# Patient Record
Sex: FEMALE | Race: WHITE | NOT HISPANIC OR LATINO | Employment: OTHER | URBAN - METROPOLITAN AREA
[De-identification: names, ages, dates, MRNs, and addresses within clinical notes are randomized per-mention and may not be internally consistent; named-entity substitution may affect disease eponyms.]

---

## 2023-04-26 DIAGNOSIS — E11.9 TYPE 2 DIABETES MELLITUS WITHOUT COMPLICATION, WITHOUT LONG-TERM CURRENT USE OF INSULIN (MULTI): Primary | ICD-10-CM

## 2023-04-26 RX ORDER — GLIPIZIDE 5 MG/1
1 TABLET, FILM COATED, EXTENDED RELEASE ORAL
COMMUNITY
Start: 2019-09-12 | End: 2023-04-26 | Stop reason: SDUPTHER

## 2023-04-27 RX ORDER — GLIPIZIDE 5 MG/1
5 TABLET, FILM COATED, EXTENDED RELEASE ORAL
Qty: 90 TABLET | Refills: 1 | Status: SHIPPED
Start: 2023-04-27 | End: 2023-08-09 | Stop reason: SDUPTHER

## 2023-05-08 ENCOUNTER — OFFICE VISIT (OUTPATIENT)
Dept: PRIMARY CARE | Facility: CLINIC | Age: 73
End: 2023-05-08
Payer: MEDICARE

## 2023-05-08 VITALS
BODY MASS INDEX: 33.57 KG/M2 | TEMPERATURE: 97.5 F | OXYGEN SATURATION: 95 % | HEART RATE: 73 BPM | WEIGHT: 208 LBS | SYSTOLIC BLOOD PRESSURE: 122 MMHG | DIASTOLIC BLOOD PRESSURE: 60 MMHG

## 2023-05-08 DIAGNOSIS — E11.9 CONTROLLED TYPE 2 DIABETES MELLITUS WITHOUT COMPLICATION, WITHOUT LONG-TERM CURRENT USE OF INSULIN (MULTI): ICD-10-CM

## 2023-05-08 DIAGNOSIS — Z00.00 MEDICARE ANNUAL WELLNESS VISIT, SUBSEQUENT: Primary | ICD-10-CM

## 2023-05-08 DIAGNOSIS — E66.09 CLASS 1 OBESITY DUE TO EXCESS CALORIES WITH SERIOUS COMORBIDITY AND BODY MASS INDEX (BMI) OF 33.0 TO 33.9 IN ADULT: ICD-10-CM

## 2023-05-08 DIAGNOSIS — R32 URINARY INCONTINENCE, UNSPECIFIED TYPE: ICD-10-CM

## 2023-05-08 DIAGNOSIS — Z23 ENCOUNTER FOR IMMUNIZATION: ICD-10-CM

## 2023-05-08 DIAGNOSIS — E11.9 TYPE 2 DIABETES MELLITUS WITHOUT COMPLICATION, WITHOUT LONG-TERM CURRENT USE OF INSULIN (MULTI): ICD-10-CM

## 2023-05-08 LAB — HBA1C MFR BLD: 7.8 % (ref 4.2–6.5)

## 2023-05-08 PROCEDURE — G0439 PPPS, SUBSEQ VISIT: HCPCS | Performed by: STUDENT IN AN ORGANIZED HEALTH CARE EDUCATION/TRAINING PROGRAM

## 2023-05-08 PROCEDURE — 1159F MED LIST DOCD IN RCRD: CPT | Performed by: STUDENT IN AN ORGANIZED HEALTH CARE EDUCATION/TRAINING PROGRAM

## 2023-05-08 PROCEDURE — 99213 OFFICE O/P EST LOW 20 MIN: CPT | Performed by: STUDENT IN AN ORGANIZED HEALTH CARE EDUCATION/TRAINING PROGRAM

## 2023-05-08 PROCEDURE — 3051F HG A1C>EQUAL 7.0%<8.0%: CPT | Performed by: STUDENT IN AN ORGANIZED HEALTH CARE EDUCATION/TRAINING PROGRAM

## 2023-05-08 PROCEDURE — 3074F SYST BP LT 130 MM HG: CPT | Performed by: STUDENT IN AN ORGANIZED HEALTH CARE EDUCATION/TRAINING PROGRAM

## 2023-05-08 PROCEDURE — 1160F RVW MEDS BY RX/DR IN RCRD: CPT | Performed by: STUDENT IN AN ORGANIZED HEALTH CARE EDUCATION/TRAINING PROGRAM

## 2023-05-08 PROCEDURE — 1170F FXNL STATUS ASSESSED: CPT | Performed by: STUDENT IN AN ORGANIZED HEALTH CARE EDUCATION/TRAINING PROGRAM

## 2023-05-08 PROCEDURE — 3078F DIAST BP <80 MM HG: CPT | Performed by: STUDENT IN AN ORGANIZED HEALTH CARE EDUCATION/TRAINING PROGRAM

## 2023-05-08 PROCEDURE — 83036 HEMOGLOBIN GLYCOSYLATED A1C: CPT | Performed by: STUDENT IN AN ORGANIZED HEALTH CARE EDUCATION/TRAINING PROGRAM

## 2023-05-08 PROCEDURE — 4010F ACE/ARB THERAPY RXD/TAKEN: CPT | Performed by: STUDENT IN AN ORGANIZED HEALTH CARE EDUCATION/TRAINING PROGRAM

## 2023-05-08 PROCEDURE — 1124F ACP DISCUSS-NO DSCNMKR DOCD: CPT | Performed by: STUDENT IN AN ORGANIZED HEALTH CARE EDUCATION/TRAINING PROGRAM

## 2023-05-08 PROCEDURE — 3008F BODY MASS INDEX DOCD: CPT | Performed by: STUDENT IN AN ORGANIZED HEALTH CARE EDUCATION/TRAINING PROGRAM

## 2023-05-08 PROCEDURE — 1036F TOBACCO NON-USER: CPT | Performed by: STUDENT IN AN ORGANIZED HEALTH CARE EDUCATION/TRAINING PROGRAM

## 2023-05-08 RX ORDER — ATORVASTATIN CALCIUM 20 MG/1
1 TABLET, FILM COATED ORAL DAILY
COMMUNITY
Start: 2016-11-29 | End: 2023-11-13 | Stop reason: SDUPTHER

## 2023-05-08 RX ORDER — METFORMIN HYDROCHLORIDE 1000 MG/1
1 TABLET ORAL DAILY
COMMUNITY
Start: 2013-09-10 | End: 2023-11-13 | Stop reason: SDUPTHER

## 2023-05-08 RX ORDER — AMLODIPINE BESYLATE 10 MG/1
1 TABLET ORAL DAILY
COMMUNITY
Start: 2020-09-04 | End: 2023-11-13 | Stop reason: SDUPTHER

## 2023-05-08 RX ORDER — ACETAMINOPHEN 500 MG
TABLET ORAL WEEKLY
COMMUNITY
Start: 2020-10-15

## 2023-05-08 RX ORDER — IRBESARTAN 75 MG/1
1 TABLET ORAL DAILY
COMMUNITY
Start: 2021-02-23 | End: 2023-11-13 | Stop reason: SDUPTHER

## 2023-05-08 RX ORDER — OXYBUTYNIN CHLORIDE 5 MG/1
1 TABLET, EXTENDED RELEASE ORAL DAILY
COMMUNITY
Start: 2023-02-01 | End: 2023-05-08 | Stop reason: SINTOL

## 2023-05-08 RX ORDER — EMPAGLIFLOZIN 25 MG/1
1 TABLET, FILM COATED ORAL DAILY
COMMUNITY
Start: 2023-02-01 | End: 2023-05-08 | Stop reason: SDUPTHER

## 2023-05-08 RX ORDER — PAROXETINE HYDROCHLORIDE 40 MG/1
1 TABLET, FILM COATED ORAL DAILY
COMMUNITY
Start: 2020-10-15

## 2023-05-08 ASSESSMENT — ENCOUNTER SYMPTOMS
CHEST TIGHTNESS: 0
FATIGUE: 0
MUSCULOSKELETAL NEGATIVE: 1
DIZZINESS: 0
HEADACHES: 0
DYSPHORIC MOOD: 0
ACTIVITY CHANGE: 0
SHORTNESS OF BREATH: 0
GASTROINTESTINAL NEGATIVE: 1
SLEEP DISTURBANCE: 0

## 2023-05-08 ASSESSMENT — ACTIVITIES OF DAILY LIVING (ADL)
DRESSING: INDEPENDENT
TAKING_MEDICATION: INDEPENDENT
GROCERY_SHOPPING: INDEPENDENT
DOING_HOUSEWORK: INDEPENDENT
MANAGING_FINANCES: INDEPENDENT
BATHING: INDEPENDENT

## 2023-05-08 ASSESSMENT — PATIENT HEALTH QUESTIONNAIRE - PHQ9
SUM OF ALL RESPONSES TO PHQ9 QUESTIONS 1 AND 2: 0
2. FEELING DOWN, DEPRESSED OR HOPELESS: NOT AT ALL
1. LITTLE INTEREST OR PLEASURE IN DOING THINGS: NOT AT ALL

## 2023-05-08 NOTE — PROGRESS NOTES
Subjective   Patient ID: Tess Nava is a 73 y.o. female who presents for Medicare Annual Wellness Visit Subsequent (Medicare annual wellness visit/A1C check).  Exercising every day. Breathing has been getting better. Cardiac workup was good. Normal stress test. Echo unremarkable. Symptoms suspected to be from deconditioning.     Diet is about the same, is not very strict with this. Has been drinking more water. Does drink pop sometimes. Last a1c 7.7%    Had increased tooth decay in March. Had an abscess.     Oxybutynin works sometimes. Noticed increased dry mouth.                 Review of Systems   Constitutional:  Negative for activity change and fatigue.   HENT:  Positive for dental problem and mouth sores.    Respiratory:  Negative for chest tightness and shortness of breath.    Cardiovascular:  Negative for chest pain.   Gastrointestinal: Negative.    Musculoskeletal: Negative.    Neurological:  Negative for dizziness and headaches.   Psychiatric/Behavioral:  Negative for dysphoric mood and sleep disturbance.    All other systems reviewed and are negative.      Objective   Physical Exam  Constitutional:       General: She is not in acute distress.  HENT:      Head: Normocephalic.   Eyes:      Pupils: Pupils are equal, round, and reactive to light.   Cardiovascular:      Rate and Rhythm: Normal rate and regular rhythm.   Pulmonary:      Effort: Pulmonary effort is normal. No respiratory distress.      Breath sounds: No wheezing, rhonchi or rales.   Musculoskeletal:         General: Normal range of motion.   Skin:     General: Skin is warm and dry.   Neurological:      General: No focal deficit present.      Mental Status: She is alert. Mental status is at baseline.      Gait: Gait normal.   Psychiatric:         Mood and Affect: Mood normal.         Behavior: Behavior normal.       Current Outpatient Medications:     amLODIPine (Norvasc) 10 mg tablet, Take 1 tablet (10 mg) by mouth once daily., Disp: , Rfl:      atorvastatin (Lipitor) 20 mg tablet, Take 1 tablet (20 mg) by mouth once daily., Disp: , Rfl:     cholecalciferol (Vitamin D-3) 50 mcg (2,000 unit) capsule, Take by mouth per week., Disp: , Rfl:     CHROMIUM ORAL, Take 800 mg by mouth once daily., Disp: , Rfl:     irbesartan (Avapro) 75 mg tablet, Take 1 tablet (75 mg) by mouth once daily., Disp: , Rfl:     metFORMIN (Glucophage) 1,000 mg tablet, Take 1 tablet (1,000 mg) by mouth once daily., Disp: , Rfl:     PaxiL 40 mg tablet, Take 1 tablet (40 mg) by mouth once daily., Disp: , Rfl:     aspirin-calcium carbonate 81 mg-300 mg calcium(777 mg) tablet, Take 1 tablet by mouth once daily., Disp: , Rfl:     diph,pertuss,acel,,tet vac,PF, (Adacel) 2 Lf-(2.5-5-3-5 mcg)-5Lf/0.5 mL injection, Inject 0.5 mL into the shoulder, thigh, or buttocks 1 time for 1 dose., Disp: 0.5 mL, Rfl: 0    empagliflozin (Jardiance) 25 mg, Take 1 tablet (25 mg) by mouth once daily., Disp: 30 tablet, Rfl: 2    fish oil concentrate (Omega-3) 120-180 mg capsule, Take by mouth., Disp: , Rfl:     glipiZIDE XL (Glucotrol XL) 5 mg 24 hr tablet, Take 1 tablet (5 mg) by mouth once daily with a meal., Disp: 90 tablet, Rfl: 1      Assessment/Plan   Diagnoses and all orders for this visit:  Medicare annual wellness visit, subsequent  Comments:  UTD on mammogram, due Sept  UTD on colon, last done 2020  tdap sent to pharmacy  Type 2 diabetes mellitus without complication, without long-term current use of insulin (CMS/[facility])  Comments:  reassess in 3 months, if still no improvement will change jardiance due to cost and minimal effectiveness  Orders:  -     POCT Glycosylated Hemoglobin (HGB A1C) docked device  -     empagliflozin (Jardiance) 25 mg; Take 1 tablet (25 mg) by mouth once daily.  Controlled type 2 diabetes mellitus without complication, without long-term current use of insulin (CMS/[facility])  -     POCT Glycosylated Hemoglobin (HGB A1C) docked device  Urinary incontinence, unspecified  type  Comments:  will stop oxybutynin  tooth decay, dry mouth, and canker sores  can consider alternate in future if recurring  Encounter for immunization  -     diph,pertuss,acel,,tet vac,PF, (Adacel) 2 Lf-(2.5-5-3-5 mcg)-5Lf/0.5 mL injection; Inject 0.5 mL into the shoulder, thigh, or buttocks 1 time for 1 dose.  Class 1 obesity due to excess calories with serious comorbidity and body mass index (BMI) of 33.0 to 33.9 in adult  Other orders  -     POCT GLYCOSYLATED HEMOGLOBIN (HGB A1C)    The patient received Provided instructions on dietary changes  Provided instructions on exercise because they have an above normal BMI.      Nazia Dean, DO

## 2023-05-12 ENCOUNTER — TELEPHONE (OUTPATIENT)
Dept: PRIMARY CARE | Facility: CLINIC | Age: 73
End: 2023-05-12
Payer: MEDICARE

## 2023-05-12 DIAGNOSIS — H10.9 BACTERIAL CONJUNCTIVITIS: Primary | ICD-10-CM

## 2023-05-12 RX ORDER — NEOMYCIN SULFATE, POLYMYXIN B SULFATE, BACITRACIN ZINC, HYDROCORTISONE 3.5; 10000; 400; 1 MG/G; [USP'U]/G; [USP'U]/G; MG/G
OINTMENT OPHTHALMIC 2 TIMES DAILY
Qty: 3.5 G | Refills: 0 | Status: SHIPPED | OUTPATIENT
Start: 2023-05-12 | End: 2023-05-19

## 2023-05-12 NOTE — TELEPHONE ENCOUNTER
Patient called she tried 4 different pharmacies  they do not have the ointment is there anything else you can prescribe her ?

## 2023-05-12 NOTE — TELEPHONE ENCOUNTER
Patient called shes been exposed to Pink eye and her eyes have watery,itchy,and goopy . Wants to know if you can prescribe her something for it

## 2023-08-09 ENCOUNTER — OFFICE VISIT (OUTPATIENT)
Dept: PRIMARY CARE | Facility: CLINIC | Age: 73
End: 2023-08-09
Payer: MEDICARE

## 2023-08-09 VITALS
SYSTOLIC BLOOD PRESSURE: 122 MMHG | DIASTOLIC BLOOD PRESSURE: 60 MMHG | HEART RATE: 67 BPM | OXYGEN SATURATION: 97 % | HEIGHT: 66 IN | WEIGHT: 203 LBS | BODY MASS INDEX: 32.62 KG/M2

## 2023-08-09 DIAGNOSIS — Z12.31 BREAST CANCER SCREENING BY MAMMOGRAM: ICD-10-CM

## 2023-08-09 DIAGNOSIS — E66.09 CLASS 1 OBESITY DUE TO EXCESS CALORIES WITH SERIOUS COMORBIDITY AND BODY MASS INDEX (BMI) OF 32.0 TO 32.9 IN ADULT: ICD-10-CM

## 2023-08-09 DIAGNOSIS — E78.2 MIXED HYPERLIPIDEMIA: ICD-10-CM

## 2023-08-09 DIAGNOSIS — I10 PRIMARY HYPERTENSION: ICD-10-CM

## 2023-08-09 DIAGNOSIS — M19.049 HAND ARTHRITIS: ICD-10-CM

## 2023-08-09 DIAGNOSIS — C02.9 CANCER OF TONGUE (MULTI): ICD-10-CM

## 2023-08-09 DIAGNOSIS — E11.9 TYPE 2 DIABETES MELLITUS WITHOUT COMPLICATION, WITHOUT LONG-TERM CURRENT USE OF INSULIN (MULTI): Primary | ICD-10-CM

## 2023-08-09 LAB — HBA1C MFR BLD: 7.8 % (ref 4.2–6.5)

## 2023-08-09 PROCEDURE — 4010F ACE/ARB THERAPY RXD/TAKEN: CPT | Performed by: STUDENT IN AN ORGANIZED HEALTH CARE EDUCATION/TRAINING PROGRAM

## 2023-08-09 PROCEDURE — 3074F SYST BP LT 130 MM HG: CPT | Performed by: STUDENT IN AN ORGANIZED HEALTH CARE EDUCATION/TRAINING PROGRAM

## 2023-08-09 PROCEDURE — 1160F RVW MEDS BY RX/DR IN RCRD: CPT | Performed by: STUDENT IN AN ORGANIZED HEALTH CARE EDUCATION/TRAINING PROGRAM

## 2023-08-09 PROCEDURE — 3078F DIAST BP <80 MM HG: CPT | Performed by: STUDENT IN AN ORGANIZED HEALTH CARE EDUCATION/TRAINING PROGRAM

## 2023-08-09 PROCEDURE — 3008F BODY MASS INDEX DOCD: CPT | Performed by: STUDENT IN AN ORGANIZED HEALTH CARE EDUCATION/TRAINING PROGRAM

## 2023-08-09 PROCEDURE — 1036F TOBACCO NON-USER: CPT | Performed by: STUDENT IN AN ORGANIZED HEALTH CARE EDUCATION/TRAINING PROGRAM

## 2023-08-09 PROCEDURE — 1159F MED LIST DOCD IN RCRD: CPT | Performed by: STUDENT IN AN ORGANIZED HEALTH CARE EDUCATION/TRAINING PROGRAM

## 2023-08-09 PROCEDURE — 99213 OFFICE O/P EST LOW 20 MIN: CPT | Performed by: STUDENT IN AN ORGANIZED HEALTH CARE EDUCATION/TRAINING PROGRAM

## 2023-08-09 PROCEDURE — 3051F HG A1C>EQUAL 7.0%<8.0%: CPT | Performed by: STUDENT IN AN ORGANIZED HEALTH CARE EDUCATION/TRAINING PROGRAM

## 2023-08-09 PROCEDURE — 83036 HEMOGLOBIN GLYCOSYLATED A1C: CPT | Mod: CLIA WAIVED TEST | Performed by: STUDENT IN AN ORGANIZED HEALTH CARE EDUCATION/TRAINING PROGRAM

## 2023-08-09 RX ORDER — GLIPIZIDE 10 MG/1
10 TABLET, FILM COATED, EXTENDED RELEASE ORAL
Qty: 90 TABLET | Refills: 1 | Status: SHIPPED | OUTPATIENT
Start: 2023-08-09 | End: 2023-11-13 | Stop reason: SDUPTHER

## 2023-08-09 ASSESSMENT — PATIENT HEALTH QUESTIONNAIRE - PHQ9
1. LITTLE INTEREST OR PLEASURE IN DOING THINGS: NOT AT ALL
SUM OF ALL RESPONSES TO PHQ9 QUESTIONS 1 AND 2: 0
2. FEELING DOWN, DEPRESSED OR HOPELESS: NOT AT ALL

## 2023-08-09 ASSESSMENT — ENCOUNTER SYMPTOMS
FATIGUE: 0
RESPIRATORY NEGATIVE: 1
NEUROLOGICAL NEGATIVE: 1
GASTROINTESTINAL NEGATIVE: 1
SLEEP DISTURBANCE: 0
ACTIVITY CHANGE: 1
CARDIOVASCULAR NEGATIVE: 1
ARTHRALGIAS: 1

## 2023-08-09 NOTE — PROGRESS NOTES
Subjective   Patient ID: Tess Nava is a 73 y.o. female who presents for Follow-up (3 month check up/A1C).  Does not check BG at home.  Last A1c was 7.8.  Has been exercising more.  Trying to stay active.  Doing well with this.    Currently taking Jardiance and metformin along with 5 mg glipizide.    Last eye exam was March.     Sleep is broken. Wakes up frequently.     Due for mammogram next month    Hand pains at times. Takes ibuprofen 1x per month which helps. Has not tried topicals. Chronic intermittent issue.         Review of Systems   Constitutional:  Positive for activity change. Negative for fatigue.   HENT: Negative.     Respiratory: Negative.     Cardiovascular: Negative.    Gastrointestinal: Negative.    Musculoskeletal:  Positive for arthralgias.   Neurological: Negative.    Psychiatric/Behavioral:  Negative for sleep disturbance.    All other systems reviewed and are negative.      Objective   Physical Exam  Vitals reviewed.   HENT:      Head: Normocephalic.      Mouth/Throat:      Mouth: Mucous membranes are moist.   Eyes:      Pupils: Pupils are equal, round, and reactive to light.   Cardiovascular:      Rate and Rhythm: Normal rate and regular rhythm.   Pulmonary:      Effort: Pulmonary effort is normal. No respiratory distress.      Breath sounds: Normal breath sounds. No wheezing or rhonchi.   Musculoskeletal:         General: Normal range of motion.   Skin:     General: Skin is warm and dry.   Neurological:      General: No focal deficit present.      Mental Status: She is alert. Mental status is at baseline.   Psychiatric:         Mood and Affect: Mood normal.         Behavior: Behavior normal.           Current Outpatient Medications:     amLODIPine (Norvasc) 10 mg tablet, Take 1 tablet (10 mg) by mouth once daily., Disp: , Rfl:     aspirin-calcium carbonate 81 mg-300 mg calcium(777 mg) tablet, Take 1 tablet by mouth once daily., Disp: , Rfl:     atorvastatin (Lipitor) 20 mg tablet, Take 1  tablet (20 mg) by mouth once daily., Disp: , Rfl:     cholecalciferol (Vitamin D-3) 50 mcg (2,000 unit) capsule, Take by mouth per week., Disp: , Rfl:     CHROMIUM ORAL, Take 800 mg by mouth once daily., Disp: , Rfl:     fish oil concentrate (Omega-3) 120-180 mg capsule, Take by mouth., Disp: , Rfl:     irbesartan (Avapro) 75 mg tablet, Take 1 tablet (75 mg) by mouth once daily., Disp: , Rfl:     metFORMIN (Glucophage) 1,000 mg tablet, Take 1 tablet (1,000 mg) by mouth once daily., Disp: , Rfl:     PaxiL 40 mg tablet, Take 1 tablet (40 mg) by mouth once daily., Disp: , Rfl:     glipiZIDE XL (Glucotrol XL) 10 mg 24 hr tablet, Take 1 tablet (10 mg) by mouth once daily with a meal., Disp: 90 tablet, Rfl: 1      Assessment/Plan   Diagnoses and all orders for this visit:  Type 2 diabetes mellitus without complication, without long-term current use of insulin (CMS/[facility])  Comments:  A1c unchanged, 7.8  DC Jardiance and go back to 10 mg glipizide due to cost and effectiveness  Up-to-date on eye exam  Orders:  -     POCT Glycosylated Hemoglobin (HGB A1C) docked device  -     glipiZIDE XL (Glucotrol XL) 10 mg 24 hr tablet; Take 1 tablet (10 mg) by mouth once daily with a meal.  Breast cancer screening by mammogram  -     BI mammo bilateral screening tomosynthesis; Future  Cancer of tongue (CMS/[facility])  Comments:  Diagnosed 2019  follows up with specialist, next appt Sept  Primary hypertension  Comments:  Well-controlled  Mixed hyperlipidemia  Class 1 obesity due to excess calories with serious comorbidity and body mass index (BMI) of 32.0 to 32.9 in adult  Comments:  Has been more active, down 5 pounds  Congratulated her efforts  Hand arthritis  Comments:  Can continue to use NSAIDs sparingly as needed, uses approximate once per month  Can also try topical Voltaren  Other orders  -     POCT GLYCOSYLATED HEMOGLOBIN (HGB A1C)    The patient received Provided instructions on exercise because they have an above normal BMI.    Follow  up in 3 months    Nazia Dean, DO

## 2023-10-19 ENCOUNTER — ANCILLARY PROCEDURE (OUTPATIENT)
Dept: RADIOLOGY | Facility: CLINIC | Age: 73
End: 2023-10-19
Payer: MEDICARE

## 2023-10-19 DIAGNOSIS — Z12.31 BREAST CANCER SCREENING BY MAMMOGRAM: ICD-10-CM

## 2023-10-19 PROCEDURE — 77063 BREAST TOMOSYNTHESIS BI: CPT | Mod: BILATERAL PROCEDURE | Performed by: RADIOLOGY

## 2023-10-19 PROCEDURE — 77063 BREAST TOMOSYNTHESIS BI: CPT | Mod: 50

## 2023-10-19 PROCEDURE — 77067 SCR MAMMO BI INCL CAD: CPT | Mod: BILATERAL PROCEDURE | Performed by: RADIOLOGY

## 2023-11-10 ENCOUNTER — APPOINTMENT (OUTPATIENT)
Dept: PRIMARY CARE | Facility: CLINIC | Age: 73
End: 2023-11-10
Payer: MEDICARE

## 2023-11-13 ENCOUNTER — OFFICE VISIT (OUTPATIENT)
Dept: PRIMARY CARE | Facility: CLINIC | Age: 73
End: 2023-11-13
Payer: MEDICARE

## 2023-11-13 VITALS
HEIGHT: 66 IN | WEIGHT: 205 LBS | OXYGEN SATURATION: 98 % | SYSTOLIC BLOOD PRESSURE: 122 MMHG | HEART RATE: 64 BPM | DIASTOLIC BLOOD PRESSURE: 70 MMHG | BODY MASS INDEX: 32.95 KG/M2

## 2023-11-13 DIAGNOSIS — I10 PRIMARY HYPERTENSION: Chronic | ICD-10-CM

## 2023-11-13 DIAGNOSIS — Z13.29 THYROID DISORDER SCREENING: ICD-10-CM

## 2023-11-13 DIAGNOSIS — E11.9 TYPE 2 DIABETES MELLITUS WITHOUT COMPLICATION, WITHOUT LONG-TERM CURRENT USE OF INSULIN (MULTI): ICD-10-CM

## 2023-11-13 DIAGNOSIS — E11.9 TYPE 2 DIABETES MELLITUS WITHOUT COMPLICATION, WITHOUT LONG-TERM CURRENT USE OF INSULIN (MULTI): Primary | ICD-10-CM

## 2023-11-13 DIAGNOSIS — Z23 ENCOUNTER FOR IMMUNIZATION: ICD-10-CM

## 2023-11-13 DIAGNOSIS — E78.2 MIXED HYPERLIPIDEMIA: ICD-10-CM

## 2023-11-13 PROBLEM — R06.02 SHORTNESS OF BREATH ON EXERTION: Status: ACTIVE | Noted: 2023-11-13

## 2023-11-13 PROBLEM — I25.10 CAD (CORONARY ARTERY DISEASE): Status: ACTIVE | Noted: 2023-11-13

## 2023-11-13 PROBLEM — R92.8 ABNORMAL MAMMOGRAM OF LEFT BREAST: Status: ACTIVE | Noted: 2023-11-13

## 2023-11-13 PROBLEM — R53.83 FATIGUE: Status: ACTIVE | Noted: 2023-11-13

## 2023-11-13 PROBLEM — F32.A DEPRESSION: Status: ACTIVE | Noted: 2023-01-25

## 2023-11-13 PROBLEM — K21.9 GERD (GASTROESOPHAGEAL REFLUX DISEASE): Status: ACTIVE | Noted: 2023-11-13

## 2023-11-13 PROBLEM — R32 URINARY INCONTINENCE IN FEMALE: Status: ACTIVE | Noted: 2023-11-13

## 2023-11-13 PROBLEM — M25.50 ARTHRALGIA: Status: ACTIVE | Noted: 2023-11-13

## 2023-11-13 PROBLEM — R07.9 CHEST PAIN: Status: RESOLVED | Noted: 2023-11-13 | Resolved: 2023-11-13

## 2023-11-13 PROBLEM — M20.40 HAMMER TOE: Status: ACTIVE | Noted: 2023-11-13

## 2023-11-13 PROBLEM — K64.9 HEMORRHOID: Status: ACTIVE | Noted: 2023-11-13

## 2023-11-13 PROBLEM — J45.909 ASTHMA (HHS-HCC): Status: ACTIVE | Noted: 2023-11-13

## 2023-11-13 PROBLEM — E78.00 HYPERCHOLESTEROLEMIA: Status: ACTIVE | Noted: 2023-11-13

## 2023-11-13 PROBLEM — N32.81 OVERACTIVE BLADDER: Status: ACTIVE | Noted: 2023-11-13

## 2023-11-13 PROBLEM — R93.89 THICKENED ENDOMETRIUM: Status: ACTIVE | Noted: 2023-11-13

## 2023-11-13 LAB
ALBUMIN SERPL BCP-MCNC: 4.1 G/DL (ref 3.4–5)
ALP SERPL-CCNC: 89 U/L (ref 33–136)
ALT SERPL W P-5'-P-CCNC: 12 U/L (ref 7–45)
ANION GAP SERPL CALC-SCNC: 14 MMOL/L (ref 10–20)
AST SERPL W P-5'-P-CCNC: 11 U/L (ref 9–39)
BILIRUB SERPL-MCNC: 1 MG/DL (ref 0–1.2)
BUN SERPL-MCNC: 26 MG/DL (ref 6–23)
CALCIUM SERPL-MCNC: 8.6 MG/DL (ref 8.6–10.6)
CHLORIDE SERPL-SCNC: 103 MMOL/L (ref 98–107)
CO2 SERPL-SCNC: 26 MMOL/L (ref 21–32)
CREAT SERPL-MCNC: 1.32 MG/DL (ref 0.5–1.05)
GFR SERPL CREATININE-BSD FRML MDRD: 43 ML/MIN/1.73M*2
GLUCOSE SERPL-MCNC: 238 MG/DL (ref 74–99)
HBA1C MFR BLD: 7.8 % (ref 4.2–6.5)
POTASSIUM SERPL-SCNC: 5 MMOL/L (ref 3.5–5.3)
PROT SERPL-MCNC: 6.5 G/DL (ref 6.4–8.2)
SODIUM SERPL-SCNC: 138 MMOL/L (ref 136–145)
TSH SERPL-ACNC: 3.12 MIU/L (ref 0.44–3.98)

## 2023-11-13 PROCEDURE — 3074F SYST BP LT 130 MM HG: CPT | Performed by: STUDENT IN AN ORGANIZED HEALTH CARE EDUCATION/TRAINING PROGRAM

## 2023-11-13 PROCEDURE — 1159F MED LIST DOCD IN RCRD: CPT | Performed by: STUDENT IN AN ORGANIZED HEALTH CARE EDUCATION/TRAINING PROGRAM

## 2023-11-13 PROCEDURE — 1036F TOBACCO NON-USER: CPT | Performed by: STUDENT IN AN ORGANIZED HEALTH CARE EDUCATION/TRAINING PROGRAM

## 2023-11-13 PROCEDURE — 36415 COLL VENOUS BLD VENIPUNCTURE: CPT

## 2023-11-13 PROCEDURE — G0009 ADMIN PNEUMOCOCCAL VACCINE: HCPCS | Performed by: STUDENT IN AN ORGANIZED HEALTH CARE EDUCATION/TRAINING PROGRAM

## 2023-11-13 PROCEDURE — 3051F HG A1C>EQUAL 7.0%<8.0%: CPT | Performed by: STUDENT IN AN ORGANIZED HEALTH CARE EDUCATION/TRAINING PROGRAM

## 2023-11-13 PROCEDURE — 90677 PCV20 VACCINE IM: CPT | Performed by: STUDENT IN AN ORGANIZED HEALTH CARE EDUCATION/TRAINING PROGRAM

## 2023-11-13 PROCEDURE — 4010F ACE/ARB THERAPY RXD/TAKEN: CPT | Performed by: STUDENT IN AN ORGANIZED HEALTH CARE EDUCATION/TRAINING PROGRAM

## 2023-11-13 PROCEDURE — 80053 COMPREHEN METABOLIC PANEL: CPT

## 2023-11-13 PROCEDURE — 84443 ASSAY THYROID STIM HORMONE: CPT

## 2023-11-13 PROCEDURE — 1160F RVW MEDS BY RX/DR IN RCRD: CPT | Performed by: STUDENT IN AN ORGANIZED HEALTH CARE EDUCATION/TRAINING PROGRAM

## 2023-11-13 PROCEDURE — 3008F BODY MASS INDEX DOCD: CPT | Performed by: STUDENT IN AN ORGANIZED HEALTH CARE EDUCATION/TRAINING PROGRAM

## 2023-11-13 PROCEDURE — 3078F DIAST BP <80 MM HG: CPT | Performed by: STUDENT IN AN ORGANIZED HEALTH CARE EDUCATION/TRAINING PROGRAM

## 2023-11-13 PROCEDURE — 99213 OFFICE O/P EST LOW 20 MIN: CPT | Performed by: STUDENT IN AN ORGANIZED HEALTH CARE EDUCATION/TRAINING PROGRAM

## 2023-11-13 PROCEDURE — 83036 HEMOGLOBIN GLYCOSYLATED A1C: CPT | Mod: CLIA WAIVED TEST | Performed by: STUDENT IN AN ORGANIZED HEALTH CARE EDUCATION/TRAINING PROGRAM

## 2023-11-13 RX ORDER — IRBESARTAN 75 MG/1
75 TABLET ORAL DAILY
Qty: 90 TABLET | Refills: 1 | Status: SHIPPED | OUTPATIENT
Start: 2023-11-13 | End: 2024-05-15 | Stop reason: SDUPTHER

## 2023-11-13 RX ORDER — GLIPIZIDE 10 MG/1
10 TABLET, FILM COATED, EXTENDED RELEASE ORAL
Qty: 90 TABLET | Refills: 1 | Status: SHIPPED | OUTPATIENT
Start: 2023-11-13 | End: 2024-05-15 | Stop reason: SDUPTHER

## 2023-11-13 RX ORDER — AMLODIPINE BESYLATE 10 MG/1
10 TABLET ORAL DAILY
Qty: 90 TABLET | Refills: 1 | Status: SHIPPED | OUTPATIENT
Start: 2023-11-13 | End: 2024-05-15 | Stop reason: SDUPTHER

## 2023-11-13 RX ORDER — METFORMIN HYDROCHLORIDE 1000 MG/1
1000 TABLET ORAL DAILY
Qty: 90 TABLET | Refills: 1 | Status: SHIPPED | OUTPATIENT
Start: 2023-11-13 | End: 2024-05-15 | Stop reason: SDUPTHER

## 2023-11-13 RX ORDER — ATORVASTATIN CALCIUM 20 MG/1
20 TABLET, FILM COATED ORAL DAILY
Qty: 90 TABLET | Refills: 1 | Status: SHIPPED | OUTPATIENT
Start: 2023-11-13 | End: 2024-05-15 | Stop reason: SDUPTHER

## 2023-11-13 ASSESSMENT — ENCOUNTER SYMPTOMS
FATIGUE: 0
DIZZINESS: 0
ACTIVITY CHANGE: 0
CHEST TIGHTNESS: 0
HEADACHES: 0
SLEEP DISTURBANCE: 0
SHORTNESS OF BREATH: 0
DYSPHORIC MOOD: 0
MUSCULOSKELETAL NEGATIVE: 1
EYE DISCHARGE: 1
GASTROINTESTINAL NEGATIVE: 1

## 2023-11-13 ASSESSMENT — PATIENT HEALTH QUESTIONNAIRE - PHQ9
2. FEELING DOWN, DEPRESSED OR HOPELESS: NOT AT ALL
1. LITTLE INTEREST OR PLEASURE IN DOING THINGS: NOT AT ALL
SUM OF ALL RESPONSES TO PHQ9 QUESTIONS 1 AND 2: 0

## 2023-11-13 NOTE — PROGRESS NOTES
Subjective   Patient ID: Tess Nava is a 73 y.o. female who presents for Follow-up (Follow up /A1C/Stye in eye ).  Left upper eyelid stye for about one month. Slowly improving, using warm compresses.     Eats snacks in moderation.     Had her flu, RSV, and covid vaccine. Would like her second pneumonia vaccine.     Up to date on mammogram and colon cancer screening.     Last A1c 7.8%, had to dc jardiance due to cost. A1c was the same on oxybutynin. Up to date on eye exam, had in April.    No other concerns today.             Review of Systems   Constitutional:  Negative for activity change and fatigue.   HENT: Negative.     Eyes:  Positive for discharge.   Respiratory:  Negative for chest tightness and shortness of breath.    Cardiovascular:  Negative for chest pain.   Gastrointestinal: Negative.    Musculoskeletal: Negative.    Neurological:  Negative for dizziness and headaches.   Psychiatric/Behavioral:  Negative for dysphoric mood and sleep disturbance.    All other systems reviewed and are negative.      Objective   Physical Exam  Vitals reviewed.   Constitutional:       General: She is not in acute distress.  HENT:      Head: Normocephalic.      Mouth/Throat:      Mouth: Mucous membranes are moist.   Eyes:      Pupils: Pupils are equal, round, and reactive to light.      Comments: Left upper lid small chalazion, healing, no erythema   Cardiovascular:      Rate and Rhythm: Normal rate and regular rhythm.   Pulmonary:      Effort: Pulmonary effort is normal. No respiratory distress.   Musculoskeletal:         General: Normal range of motion.   Skin:     General: Skin is warm and dry.   Neurological:      Mental Status: She is alert. Mental status is at baseline.   Psychiatric:         Mood and Affect: Mood normal.         Behavior: Behavior normal.         Body mass index is 33.09 kg/m².      Current Outpatient Medications:     aspirin-calcium carbonate 81 mg-300 mg calcium(777 mg) tablet, Take 1 tablet by  mouth once daily., Disp: , Rfl:     cholecalciferol (Vitamin D-3) 50 mcg (2,000 unit) capsule, Take by mouth per week., Disp: , Rfl:     CHROMIUM ORAL, Take 800 mg by mouth once daily., Disp: , Rfl:     fish oil concentrate (Omega-3) 120-180 mg capsule, Take by mouth., Disp: , Rfl:     PaxiL 40 mg tablet, Take 1 tablet (40 mg) by mouth once daily., Disp: , Rfl:     amLODIPine (Norvasc) 10 mg tablet, Take 1 tablet (10 mg) by mouth once daily., Disp: 90 tablet, Rfl: 1    atorvastatin (Lipitor) 20 mg tablet, Take 1 tablet (20 mg) by mouth once daily., Disp: 90 tablet, Rfl: 1    glipiZIDE XL (Glucotrol XL) 10 mg 24 hr tablet, Take 1 tablet (10 mg) by mouth once daily with a meal., Disp: 90 tablet, Rfl: 1    irbesartan (Avapro) 75 mg tablet, Take 1 tablet (75 mg) by mouth once daily., Disp: 90 tablet, Rfl: 1    metFORMIN (Glucophage) 1,000 mg tablet, Take 1 tablet (1,000 mg) by mouth once daily., Disp: 90 tablet, Rfl: 1      Assessment/Plan   Diagnoses and all orders for this visit:  Type 2 diabetes mellitus without complication, without long-term current use of insulin (CMS/[facility])  Comments:  stable, a1c unchanged at 7.8%  continue meds  watch carbs  UTD oneye exam  Orders:  -     POCT Glycosylated Hemoglobin (HGB A1C) docked device  -     metFORMIN (Glucophage) 1,000 mg tablet; Take 1 tablet (1,000 mg) by mouth once daily.  -     glipiZIDE XL (Glucotrol XL) 10 mg 24 hr tablet; Take 1 tablet (10 mg) by mouth once daily with a meal.  -     Comprehensive metabolic panel  Type 2 diabetes mellitus without complication, without long-term current use of insulin (CMS/[facility])  Comments:  A1c unchanged, 7.8  DC Jardiance and go back to 10 mg glipizide due to cost and effectiveness  Up-to-date on eye exam  Orders:  -     POCT Glycosylated Hemoglobin (HGB A1C) docked device  -     metFORMIN (Glucophage) 1,000 mg tablet; Take 1 tablet (1,000 mg) by mouth once daily.  -     glipiZIDE XL (Glucotrol XL) 10 mg 24 hr tablet; Take 1 tablet  (10 mg) by mouth once daily with a meal.  -     Comprehensive metabolic panel  Primary hypertension  Comments:  BP well controlled  Orders:  -     amLODIPine (Norvasc) 10 mg tablet; Take 1 tablet (10 mg) by mouth once daily.  -     irbesartan (Avapro) 75 mg tablet; Take 1 tablet (75 mg) by mouth once daily.  -     Comprehensive metabolic panel  Mixed hyperlipidemia  -     atorvastatin (Lipitor) 20 mg tablet; Take 1 tablet (20 mg) by mouth once daily.  Thyroid disorder screening  -     TSH with reflex to Free T4 if abnormal  Encounter for immunization  -     Pneumococcal conjugate vaccine, 20-valent (PREVNAR 20)  BMI 33.0-33.9,adult  Other orders  -     POCT GLYCOSYLATED HEMOGLOBIN (HGB A1C)    Follow up in 6 months    The patient received Provided instructions on dietary changes because they have an above normal BMI.      Nazia Dean, DO

## 2024-03-11 ENCOUNTER — OFFICE VISIT (OUTPATIENT)
Dept: PRIMARY CARE | Facility: CLINIC | Age: 74
End: 2024-03-11
Payer: MEDICARE

## 2024-03-11 VITALS
WEIGHT: 217 LBS | SYSTOLIC BLOOD PRESSURE: 140 MMHG | DIASTOLIC BLOOD PRESSURE: 50 MMHG | HEART RATE: 78 BPM | BODY MASS INDEX: 34.87 KG/M2 | HEIGHT: 66 IN | OXYGEN SATURATION: 96 %

## 2024-03-11 DIAGNOSIS — G89.29 CHRONIC SI JOINT PAIN: ICD-10-CM

## 2024-03-11 DIAGNOSIS — N18.30 STAGE 3 CHRONIC KIDNEY DISEASE, UNSPECIFIED WHETHER STAGE 3A OR 3B CKD (MULTI): ICD-10-CM

## 2024-03-11 DIAGNOSIS — M53.3 CHRONIC SI JOINT PAIN: ICD-10-CM

## 2024-03-11 DIAGNOSIS — C02.9 CANCER OF TONGUE (MULTI): ICD-10-CM

## 2024-03-11 DIAGNOSIS — R42 VERTIGO: Primary | ICD-10-CM

## 2024-03-11 DIAGNOSIS — E11.9 TYPE 2 DIABETES MELLITUS WITHOUT COMPLICATION, WITHOUT LONG-TERM CURRENT USE OF INSULIN (MULTI): ICD-10-CM

## 2024-03-11 PROCEDURE — 3078F DIAST BP <80 MM HG: CPT | Performed by: STUDENT IN AN ORGANIZED HEALTH CARE EDUCATION/TRAINING PROGRAM

## 2024-03-11 PROCEDURE — 1159F MED LIST DOCD IN RCRD: CPT | Performed by: STUDENT IN AN ORGANIZED HEALTH CARE EDUCATION/TRAINING PROGRAM

## 2024-03-11 PROCEDURE — 1160F RVW MEDS BY RX/DR IN RCRD: CPT | Performed by: STUDENT IN AN ORGANIZED HEALTH CARE EDUCATION/TRAINING PROGRAM

## 2024-03-11 PROCEDURE — 1036F TOBACCO NON-USER: CPT | Performed by: STUDENT IN AN ORGANIZED HEALTH CARE EDUCATION/TRAINING PROGRAM

## 2024-03-11 PROCEDURE — 3077F SYST BP >= 140 MM HG: CPT | Performed by: STUDENT IN AN ORGANIZED HEALTH CARE EDUCATION/TRAINING PROGRAM

## 2024-03-11 PROCEDURE — 4010F ACE/ARB THERAPY RXD/TAKEN: CPT | Performed by: STUDENT IN AN ORGANIZED HEALTH CARE EDUCATION/TRAINING PROGRAM

## 2024-03-11 PROCEDURE — 3008F BODY MASS INDEX DOCD: CPT | Performed by: STUDENT IN AN ORGANIZED HEALTH CARE EDUCATION/TRAINING PROGRAM

## 2024-03-11 PROCEDURE — 99214 OFFICE O/P EST MOD 30 MIN: CPT | Performed by: STUDENT IN AN ORGANIZED HEALTH CARE EDUCATION/TRAINING PROGRAM

## 2024-03-11 ASSESSMENT — ENCOUNTER SYMPTOMS
DIZZINESS: 1
PSYCHIATRIC NEGATIVE: 1
CONSTITUTIONAL NEGATIVE: 1
RESPIRATORY NEGATIVE: 1
BACK PAIN: 1
CARDIOVASCULAR NEGATIVE: 1

## 2024-03-11 NOTE — PROGRESS NOTES
Subjective   Patient ID: Tess Nava is a 74 y.o. female who presents for Vertigo (Vertigo for the last few months. Some days its worse than others. Today she is feeling ok).  Had vertigo 12 years ago. Went to a specialist who did Epley maneuver and it went away.     Last week had multiple episodes, lasting for half a day at a time.     Had been trying the Epley maneuver at home. Interested in PT.     Moving her head or looking in certain directions can trigger it. Mild nausea with one episode.     Had tried meclizine in the past which did not help.     SI joint pain hurting sometimes. Worse when she stands. Does her exercise classes which help.     Uses heating pad. Takes occasional Advil or aleve. Sleeping on her back now which also helps.     Has not tried topical patches.               Review of Systems   Constitutional: Negative.    HENT:  Positive for congestion.    Respiratory: Negative.     Cardiovascular: Negative.    Musculoskeletal:  Positive for back pain.   Neurological:  Positive for dizziness.   Psychiatric/Behavioral: Negative.     All other systems reviewed and are negative.      Objective   Physical Exam  Vitals reviewed.   Constitutional:       Appearance: Normal appearance.   HENT:      Head: Normocephalic.      Mouth/Throat:      Mouth: Mucous membranes are moist.   Eyes:      Comments: Minimal right horizontal nystagmus   Skin:     General: Skin is warm and dry.   Neurological:      Mental Status: She is alert.   Psychiatric:         Mood and Affect: Mood normal.         Behavior: Behavior normal.         Body mass index is 35.02 kg/m².      Current Outpatient Medications:     amLODIPine (Norvasc) 10 mg tablet, Take 1 tablet (10 mg) by mouth once daily., Disp: 90 tablet, Rfl: 1    aspirin-calcium carbonate 81 mg-300 mg calcium(777 mg) tablet, Take 1 tablet by mouth once daily., Disp: , Rfl:     atorvastatin (Lipitor) 20 mg tablet, Take 1 tablet (20 mg) by mouth once daily., Disp: 90 tablet,  Rfl: 1    cholecalciferol (Vitamin D-3) 50 mcg (2,000 unit) capsule, Take by mouth per week., Disp: , Rfl:     CHROMIUM ORAL, Take 800 mg by mouth once daily., Disp: , Rfl:     fish oil concentrate (Omega-3) 120-180 mg capsule, Take by mouth., Disp: , Rfl:     glipiZIDE XL (Glucotrol XL) 10 mg 24 hr tablet, Take 1 tablet (10 mg) by mouth once daily with a meal., Disp: 90 tablet, Rfl: 1    irbesartan (Avapro) 75 mg tablet, Take 1 tablet (75 mg) by mouth once daily., Disp: 90 tablet, Rfl: 1    metFORMIN (Glucophage) 1,000 mg tablet, Take 1 tablet (1,000 mg) by mouth once daily., Disp: 90 tablet, Rfl: 1    PaxiL 40 mg tablet, Take 1 tablet (40 mg) by mouth once daily., Disp: , Rfl:       Assessment/Plan   Diagnoses and all orders for this visit:  Vertigo  Comments:  continue epley maneuver  meclizine previously not helpful  recommended flonase  vestibular therapy ordered  Orders:  -     Referral to Physical Therapy; Future  Chronic SI joint pain  Comments:  continue home exericses  recommended adding lidocaine patches  PT referral given  Orders:  -     Referral to Physical Therapy; Future  Stage 3 chronic kidney disease, unspecified whether stage 3a or 3b CKD (CMS/[facility])  Cancer of tongue (CMS/[facility])  Type 2 diabetes mellitus without complication, without long-term current use of insulin (CMS/[facility])    Follow up as needed       Nazia Dean DO 03/11/24 12:36 PM

## 2024-05-15 ENCOUNTER — OFFICE VISIT (OUTPATIENT)
Dept: PRIMARY CARE | Facility: CLINIC | Age: 74
End: 2024-05-15
Payer: MEDICARE

## 2024-05-15 VITALS
HEART RATE: 66 BPM | BODY MASS INDEX: 34.39 KG/M2 | WEIGHT: 214 LBS | HEIGHT: 66 IN | SYSTOLIC BLOOD PRESSURE: 124 MMHG | DIASTOLIC BLOOD PRESSURE: 50 MMHG | OXYGEN SATURATION: 97 %

## 2024-05-15 DIAGNOSIS — N18.30 STAGE 3 CHRONIC KIDNEY DISEASE, UNSPECIFIED WHETHER STAGE 3A OR 3B CKD (MULTI): ICD-10-CM

## 2024-05-15 DIAGNOSIS — Z00.00 MEDICARE ANNUAL WELLNESS VISIT, SUBSEQUENT: Primary | ICD-10-CM

## 2024-05-15 DIAGNOSIS — E11.9 TYPE 2 DIABETES MELLITUS WITHOUT COMPLICATION, WITHOUT LONG-TERM CURRENT USE OF INSULIN (MULTI): ICD-10-CM

## 2024-05-15 DIAGNOSIS — E78.2 MIXED HYPERLIPIDEMIA: ICD-10-CM

## 2024-05-15 DIAGNOSIS — I10 PRIMARY HYPERTENSION: Chronic | ICD-10-CM

## 2024-05-15 LAB — HBA1C MFR BLD: 8.9 % (ref 4.2–6.5)

## 2024-05-15 PROCEDURE — 1159F MED LIST DOCD IN RCRD: CPT | Performed by: STUDENT IN AN ORGANIZED HEALTH CARE EDUCATION/TRAINING PROGRAM

## 2024-05-15 PROCEDURE — 1160F RVW MEDS BY RX/DR IN RCRD: CPT | Performed by: STUDENT IN AN ORGANIZED HEALTH CARE EDUCATION/TRAINING PROGRAM

## 2024-05-15 PROCEDURE — 1036F TOBACCO NON-USER: CPT | Performed by: STUDENT IN AN ORGANIZED HEALTH CARE EDUCATION/TRAINING PROGRAM

## 2024-05-15 PROCEDURE — 99214 OFFICE O/P EST MOD 30 MIN: CPT | Performed by: STUDENT IN AN ORGANIZED HEALTH CARE EDUCATION/TRAINING PROGRAM

## 2024-05-15 PROCEDURE — G0439 PPPS, SUBSEQ VISIT: HCPCS | Performed by: STUDENT IN AN ORGANIZED HEALTH CARE EDUCATION/TRAINING PROGRAM

## 2024-05-15 PROCEDURE — 3052F HG A1C>EQUAL 8.0%<EQUAL 9.0%: CPT | Performed by: STUDENT IN AN ORGANIZED HEALTH CARE EDUCATION/TRAINING PROGRAM

## 2024-05-15 PROCEDURE — 83036 HEMOGLOBIN GLYCOSYLATED A1C: CPT | Mod: CLIA WAIVED TEST | Performed by: STUDENT IN AN ORGANIZED HEALTH CARE EDUCATION/TRAINING PROGRAM

## 2024-05-15 PROCEDURE — 3008F BODY MASS INDEX DOCD: CPT | Performed by: STUDENT IN AN ORGANIZED HEALTH CARE EDUCATION/TRAINING PROGRAM

## 2024-05-15 PROCEDURE — 3078F DIAST BP <80 MM HG: CPT | Performed by: STUDENT IN AN ORGANIZED HEALTH CARE EDUCATION/TRAINING PROGRAM

## 2024-05-15 PROCEDURE — 3074F SYST BP LT 130 MM HG: CPT | Performed by: STUDENT IN AN ORGANIZED HEALTH CARE EDUCATION/TRAINING PROGRAM

## 2024-05-15 PROCEDURE — 4010F ACE/ARB THERAPY RXD/TAKEN: CPT | Performed by: STUDENT IN AN ORGANIZED HEALTH CARE EDUCATION/TRAINING PROGRAM

## 2024-05-15 PROCEDURE — 1170F FXNL STATUS ASSESSED: CPT | Performed by: STUDENT IN AN ORGANIZED HEALTH CARE EDUCATION/TRAINING PROGRAM

## 2024-05-15 RX ORDER — METFORMIN HYDROCHLORIDE 1000 MG/1
1000 TABLET ORAL DAILY
Qty: 90 TABLET | Refills: 3 | Status: SHIPPED | OUTPATIENT
Start: 2024-05-15

## 2024-05-15 RX ORDER — ATORVASTATIN CALCIUM 20 MG/1
20 TABLET, FILM COATED ORAL DAILY
Qty: 90 TABLET | Refills: 3 | Status: SHIPPED | OUTPATIENT
Start: 2024-05-15

## 2024-05-15 RX ORDER — GLIPIZIDE 10 MG/1
10 TABLET, FILM COATED, EXTENDED RELEASE ORAL
Qty: 90 TABLET | Refills: 3 | Status: SHIPPED | OUTPATIENT
Start: 2024-05-15

## 2024-05-15 RX ORDER — AMLODIPINE BESYLATE 10 MG/1
10 TABLET ORAL DAILY
Qty: 90 TABLET | Refills: 3 | Status: SHIPPED | OUTPATIENT
Start: 2024-05-15

## 2024-05-15 RX ORDER — IRBESARTAN 75 MG/1
75 TABLET ORAL DAILY
Qty: 90 TABLET | Refills: 3 | Status: SHIPPED | OUTPATIENT
Start: 2024-05-15

## 2024-05-15 RX ORDER — PIOGLITAZONEHYDROCHLORIDE 15 MG/1
15 TABLET ORAL DAILY
Qty: 30 TABLET | Refills: 11 | Status: SHIPPED | OUTPATIENT
Start: 2024-05-15 | End: 2025-05-15

## 2024-05-15 ASSESSMENT — ENCOUNTER SYMPTOMS
POLYDIPSIA: 0
RESPIRATORY NEGATIVE: 1
MUSCULOSKELETAL NEGATIVE: 1
FATIGUE: 1
NEUROLOGICAL NEGATIVE: 1
GASTROINTESTINAL NEGATIVE: 1
SLEEP DISTURBANCE: 1
CARDIOVASCULAR NEGATIVE: 1

## 2024-05-15 ASSESSMENT — ACTIVITIES OF DAILY LIVING (ADL)
DRESSING: INDEPENDENT
DOING_HOUSEWORK: INDEPENDENT
GROCERY_SHOPPING: INDEPENDENT
MANAGING_FINANCES: INDEPENDENT
BATHING: INDEPENDENT
TAKING_MEDICATION: INDEPENDENT

## 2024-05-15 ASSESSMENT — PATIENT HEALTH QUESTIONNAIRE - PHQ9
1. LITTLE INTEREST OR PLEASURE IN DOING THINGS: NOT AT ALL
1. LITTLE INTEREST OR PLEASURE IN DOING THINGS: NOT AT ALL
SUM OF ALL RESPONSES TO PHQ9 QUESTIONS 1 AND 2: 0
2. FEELING DOWN, DEPRESSED OR HOPELESS: NOT AT ALL
SUM OF ALL RESPONSES TO PHQ9 QUESTIONS 1 AND 2: 0
2. FEELING DOWN, DEPRESSED OR HOPELESS: NOT AT ALL

## 2024-05-15 NOTE — PROGRESS NOTES
Subjective   Patient ID: Tess Nava is a 74 y.o. female who presents for Medicare Annual Wellness Visit Subsequent (Medicare annual /A1C check ).  Greek yogurt and tea for breakfast. Donut, cake, or toast. Lunch is spaghetti, leftover meatloaf, or restaurant. Usually dinner is smaller. Salad or ice cream for dinner.     Last A1c 7.8%. Has been diabetic for 20 years. Does not check her sugar due to cost of testing supplies.     Exercising 3x per week. States she could add in more walking.     Was on jardiance for a few months last year but stopped due to cost and unchanged blood sugar.     Vertigo went away.     Recent diabetic eye exam was normal.     BP stable.     Due for mammogram in October.     No other concerns today.         Review of Systems   Constitutional:  Positive for fatigue.   HENT: Negative.     Respiratory: Negative.     Cardiovascular: Negative.    Gastrointestinal: Negative.    Endocrine: Negative for polydipsia and polyuria.   Genitourinary: Negative.    Musculoskeletal: Negative.    Neurological: Negative.    Psychiatric/Behavioral:  Positive for sleep disturbance.        Objective   Physical Exam  Vitals reviewed.   Constitutional:       General: She is not in acute distress.  HENT:      Head: Normocephalic.      Right Ear: External ear normal.      Left Ear: External ear normal.      Nose: Nose normal.   Eyes:      Extraocular Movements: Extraocular movements intact.      Pupils: Pupils are equal, round, and reactive to light.   Cardiovascular:      Rate and Rhythm: Normal rate and regular rhythm.      Heart sounds: Normal heart sounds.   Pulmonary:      Effort: Pulmonary effort is normal.      Breath sounds: Normal breath sounds.   Abdominal:      Palpations: Abdomen is soft.      Tenderness: There is no abdominal tenderness. There is no guarding.   Musculoskeletal:      Cervical back: Normal range of motion and neck supple.   Skin:     General: Skin is warm and dry.   Neurological:       Mental Status: She is alert. Mental status is at baseline.   Psychiatric:         Mood and Affect: Mood normal.         Behavior: Behavior normal.         Body mass index is 34.54 kg/m².      Current Outpatient Medications:     aspirin-calcium carbonate 81 mg-300 mg calcium(777 mg) tablet, Take 1 tablet by mouth once daily., Disp: , Rfl:     cholecalciferol (Vitamin D-3) 50 mcg (2,000 unit) capsule, Take by mouth per week., Disp: , Rfl:     CHROMIUM ORAL, Take 800 mg by mouth once daily., Disp: , Rfl:     fish oil concentrate (Omega-3) 120-180 mg capsule, Take by mouth., Disp: , Rfl:     PaxiL 40 mg tablet, Take 1 tablet (40 mg) by mouth once daily., Disp: , Rfl:     amLODIPine (Norvasc) 10 mg tablet, Take 1 tablet (10 mg) by mouth once daily., Disp: 90 tablet, Rfl: 3    atorvastatin (Lipitor) 20 mg tablet, Take 1 tablet (20 mg) by mouth once daily., Disp: 90 tablet, Rfl: 3    glipiZIDE XL (Glucotrol XL) 10 mg 24 hr tablet, Take 1 tablet (10 mg) by mouth once daily with breakfast., Disp: 90 tablet, Rfl: 3    irbesartan (Avapro) 75 mg tablet, Take 1 tablet (75 mg) by mouth once daily., Disp: 90 tablet, Rfl: 3    metFORMIN (Glucophage) 1,000 mg tablet, Take 1 tablet (1,000 mg) by mouth once daily., Disp: 90 tablet, Rfl: 3    pioglitazone (Actos) 15 mg tablet, Take 1 tablet (15 mg) by mouth once daily., Disp: 30 tablet, Rfl: 11      Assessment/Plan   Diagnoses and all orders for this visit:  Medicare annual wellness visit, subsequent  Comments:  UTD on mammogram, due 10/24  Type 2 diabetes mellitus without complication, without long-term current use of insulin (Multi)  -     POCT Glycosylated Hemoglobin (HGB A1C) docked device  -     glipiZIDE XL (Glucotrol XL) 10 mg 24 hr tablet; Take 1 tablet (10 mg) by mouth once daily with breakfast.  -     metFORMIN (Glucophage) 1,000 mg tablet; Take 1 tablet (1,000 mg) by mouth once daily.  -     pioglitazone (Actos) 15 mg tablet; Take 1 tablet (15 mg) by mouth once  daily.  Primary hypertension  Comments:  BP well controlled  Orders:  -     amLODIPine (Norvasc) 10 mg tablet; Take 1 tablet (10 mg) by mouth once daily.  -     irbesartan (Avapro) 75 mg tablet; Take 1 tablet (75 mg) by mouth once daily.  Type 2 diabetes mellitus without complication, without long-term current use of insulin ([facility])  Comments:  A1c unchanged, 7.8  DC Jardiance and go back to 10 mg glipizide due to cost and effectiveness  Up-to-date on eye exam  Orders:  -     POCT Glycosylated Hemoglobin (HGB A1C) docked device  -     glipiZIDE XL (Glucotrol XL) 10 mg 24 hr tablet; Take 1 tablet (10 mg) by mouth once daily with breakfast.  -     metFORMIN (Glucophage) 1,000 mg tablet; Take 1 tablet (1,000 mg) by mouth once daily.  -     pioglitazone (Actos) 15 mg tablet; Take 1 tablet (15 mg) by mouth once daily.  Mixed hyperlipidemia  -     atorvastatin (Lipitor) 20 mg tablet; Take 1 tablet (20 mg) by mouth once daily.  Stage 3 chronic kidney disease, unspecified whether stage 3a or 3b CKD ([facility])  Comments:  stable  BMI 34.0-34.9,adult  Comments:  discussed increasing physical activity and cutting down on sweets  Other orders  -     POCT GLYCOSYLATED HEMOGLOBIN (HGB A1C)    The patient received Provided instructions on dietary changes  Provided instructions on exercise  Advised to Increase physical activity because they have an above normal BMI.      Follow up in 3 months       Nazia Dean DO 05/15/24 2:39 PM

## 2024-05-28 DIAGNOSIS — E11.9 TYPE 2 DIABETES MELLITUS WITHOUT COMPLICATION, WITHOUT LONG-TERM CURRENT USE OF INSULIN (MULTI): ICD-10-CM

## 2024-05-28 RX ORDER — INSULIN PUMP SYRINGE, 3 ML
EACH MISCELLANEOUS
Qty: 1 EACH | Refills: 0 | Status: SHIPPED | OUTPATIENT
Start: 2024-05-28 | End: 2024-05-29 | Stop reason: SDUPTHER

## 2024-05-28 RX ORDER — IBUPROFEN 200 MG
CAPSULE ORAL
Qty: 100 STRIP | Refills: 3 | Status: SHIPPED | OUTPATIENT
Start: 2024-05-28 | End: 2024-05-29 | Stop reason: SDUPTHER

## 2024-05-28 RX ORDER — LANCETS
EACH MISCELLANEOUS
Qty: 100 EACH | Refills: 3 | Status: SHIPPED | OUTPATIENT
Start: 2024-05-28 | End: 2024-05-29 | Stop reason: SDUPTHER

## 2024-05-28 RX ORDER — INSULIN PUMP SYRINGE, 3 ML
EACH MISCELLANEOUS
Qty: 1 EACH | Refills: 0 | Status: SHIPPED | OUTPATIENT
Start: 2024-05-28 | End: 2024-05-28 | Stop reason: SDUPTHER

## 2024-05-29 DIAGNOSIS — E11.9 TYPE 2 DIABETES MELLITUS WITHOUT COMPLICATION, WITHOUT LONG-TERM CURRENT USE OF INSULIN (MULTI): ICD-10-CM

## 2024-05-29 RX ORDER — LANCETS
EACH MISCELLANEOUS
Qty: 100 EACH | Refills: 3 | Status: SHIPPED | OUTPATIENT
Start: 2024-05-29

## 2024-05-29 RX ORDER — INSULIN PUMP SYRINGE, 3 ML
EACH MISCELLANEOUS
Qty: 1 EACH | Refills: 0 | Status: SHIPPED | OUTPATIENT
Start: 2024-05-29

## 2024-05-29 RX ORDER — IBUPROFEN 200 MG
CAPSULE ORAL
Qty: 100 STRIP | Refills: 3 | Status: SHIPPED | OUTPATIENT
Start: 2024-05-29

## 2024-05-29 NOTE — TELEPHONE ENCOUNTER
Pharmacist calling stating we needed to re sent in rx's for the testing supplies making sure to note in each one (in the notes) * Non insulin dependent*   I fixed them and re pended them to you.

## 2024-08-21 ENCOUNTER — APPOINTMENT (OUTPATIENT)
Dept: PRIMARY CARE | Facility: CLINIC | Age: 74
End: 2024-08-21
Payer: MEDICARE

## 2024-08-21 VITALS
DIASTOLIC BLOOD PRESSURE: 50 MMHG | SYSTOLIC BLOOD PRESSURE: 128 MMHG | WEIGHT: 216 LBS | HEART RATE: 54 BPM | BODY MASS INDEX: 34.72 KG/M2 | HEIGHT: 66 IN | OXYGEN SATURATION: 97 %

## 2024-08-21 DIAGNOSIS — E78.2 MIXED HYPERLIPIDEMIA: ICD-10-CM

## 2024-08-21 DIAGNOSIS — E11.9 TYPE 2 DIABETES MELLITUS WITHOUT COMPLICATION, WITHOUT LONG-TERM CURRENT USE OF INSULIN (MULTI): Chronic | ICD-10-CM

## 2024-08-21 DIAGNOSIS — E11.9 TYPE 2 DIABETES MELLITUS WITHOUT COMPLICATION, WITHOUT LONG-TERM CURRENT USE OF INSULIN (MULTI): ICD-10-CM

## 2024-08-21 DIAGNOSIS — Z12.31 BREAST CANCER SCREENING BY MAMMOGRAM: Primary | ICD-10-CM

## 2024-08-21 DIAGNOSIS — I10 PRIMARY HYPERTENSION: Chronic | ICD-10-CM

## 2024-08-21 LAB — HBA1C MFR BLD: 7.6 % (ref 4.2–6.5)

## 2024-08-21 PROCEDURE — 1036F TOBACCO NON-USER: CPT | Performed by: STUDENT IN AN ORGANIZED HEALTH CARE EDUCATION/TRAINING PROGRAM

## 2024-08-21 PROCEDURE — 4010F ACE/ARB THERAPY RXD/TAKEN: CPT | Performed by: STUDENT IN AN ORGANIZED HEALTH CARE EDUCATION/TRAINING PROGRAM

## 2024-08-21 PROCEDURE — 3078F DIAST BP <80 MM HG: CPT | Performed by: STUDENT IN AN ORGANIZED HEALTH CARE EDUCATION/TRAINING PROGRAM

## 2024-08-21 PROCEDURE — 83036 HEMOGLOBIN GLYCOSYLATED A1C: CPT | Mod: CLIA WAIVED TEST | Performed by: STUDENT IN AN ORGANIZED HEALTH CARE EDUCATION/TRAINING PROGRAM

## 2024-08-21 PROCEDURE — 3051F HG A1C>EQUAL 7.0%<8.0%: CPT | Performed by: STUDENT IN AN ORGANIZED HEALTH CARE EDUCATION/TRAINING PROGRAM

## 2024-08-21 PROCEDURE — 99213 OFFICE O/P EST LOW 20 MIN: CPT | Performed by: STUDENT IN AN ORGANIZED HEALTH CARE EDUCATION/TRAINING PROGRAM

## 2024-08-21 PROCEDURE — 1159F MED LIST DOCD IN RCRD: CPT | Performed by: STUDENT IN AN ORGANIZED HEALTH CARE EDUCATION/TRAINING PROGRAM

## 2024-08-21 PROCEDURE — 3008F BODY MASS INDEX DOCD: CPT | Performed by: STUDENT IN AN ORGANIZED HEALTH CARE EDUCATION/TRAINING PROGRAM

## 2024-08-21 PROCEDURE — 1160F RVW MEDS BY RX/DR IN RCRD: CPT | Performed by: STUDENT IN AN ORGANIZED HEALTH CARE EDUCATION/TRAINING PROGRAM

## 2024-08-21 PROCEDURE — 3074F SYST BP LT 130 MM HG: CPT | Performed by: STUDENT IN AN ORGANIZED HEALTH CARE EDUCATION/TRAINING PROGRAM

## 2024-08-21 RX ORDER — GLIPIZIDE 10 MG/1
10 TABLET, FILM COATED, EXTENDED RELEASE ORAL
Qty: 90 TABLET | Refills: 3 | Status: SHIPPED | OUTPATIENT
Start: 2024-08-21

## 2024-08-21 RX ORDER — METFORMIN HYDROCHLORIDE 1000 MG/1
1000 TABLET ORAL DAILY
Qty: 90 TABLET | Refills: 3 | Status: SHIPPED | OUTPATIENT
Start: 2024-08-21

## 2024-08-21 RX ORDER — ATORVASTATIN CALCIUM 20 MG/1
20 TABLET, FILM COATED ORAL DAILY
Qty: 90 TABLET | Refills: 3 | Status: SHIPPED | OUTPATIENT
Start: 2024-08-21

## 2024-08-21 RX ORDER — IRBESARTAN 75 MG/1
75 TABLET ORAL DAILY
Qty: 90 TABLET | Refills: 3 | Status: SHIPPED | OUTPATIENT
Start: 2024-08-21

## 2024-08-21 ASSESSMENT — PATIENT HEALTH QUESTIONNAIRE - PHQ9
2. FEELING DOWN, DEPRESSED OR HOPELESS: NOT AT ALL
SUM OF ALL RESPONSES TO PHQ9 QUESTIONS 1 AND 2: 0
1. LITTLE INTEREST OR PLEASURE IN DOING THINGS: NOT AT ALL

## 2024-08-21 ASSESSMENT — ENCOUNTER SYMPTOMS
FATIGUE: 1
PSYCHIATRIC NEGATIVE: 1
CARDIOVASCULAR NEGATIVE: 1
ARTHRALGIAS: 1
NEUROLOGICAL NEGATIVE: 1
RESPIRATORY NEGATIVE: 1

## 2024-08-21 NOTE — PROGRESS NOTES
Subjective   Patient ID: Tess Nava is a 74 y.o. female who presents for Follow-up (Follow up /A1C Check ).  Last A1c 8.9%. At last visit stopped jardiance due to ineffectiveness and cost. Resumed glipizide and added pioglitazone. No side effects, tolerating well.     Has been watching her diet, trying to balance her carb intake and limit meals out.     No other concerns today.         Review of Systems   Constitutional:  Positive for fatigue.   HENT: Negative.     Respiratory: Negative.     Cardiovascular: Negative.    Musculoskeletal:  Positive for arthralgias.   Neurological: Negative.    Psychiatric/Behavioral: Negative.     All other systems reviewed and are negative.      Objective   Physical Exam  Vitals reviewed.   Constitutional:       General: She is not in acute distress.     Appearance: Normal appearance.   Eyes:      Pupils: Pupils are equal, round, and reactive to light.   Cardiovascular:      Rate and Rhythm: Normal rate and regular rhythm.   Pulmonary:      Effort: Pulmonary effort is normal. No respiratory distress.   Musculoskeletal:         General: Normal range of motion.   Skin:     General: Skin is warm and dry.   Neurological:      Mental Status: She is alert. Mental status is at baseline.   Psychiatric:         Mood and Affect: Mood normal.         Behavior: Behavior normal.         Body mass index is 34.86 kg/m².      Current Outpatient Medications:     amLODIPine (Norvasc) 10 mg tablet, Take 1 tablet (10 mg) by mouth once daily., Disp: 90 tablet, Rfl: 3    aspirin-calcium carbonate 81 mg-300 mg calcium(777 mg) tablet, Take 1 tablet by mouth once daily., Disp: , Rfl:     Blood glucose monitoring meter kit (Blood Glucose Monitoring) kit, Use to test blood sugar *Non insulin dependent*, Disp: 1 each, Rfl: 0    blood sugar diagnostic (Blood Glucose Test) strip, Use to test blood sugar once daily NON INSULIN DEPENDANT, Disp: 100 strip, Rfl: 3    cholecalciferol (Vitamin D-3) 50 mcg (2,000  unit) capsule, Take by mouth per week., Disp: , Rfl:     CHROMIUM ORAL, Take 800 mg by mouth once daily., Disp: , Rfl:     fish oil concentrate (Omega-3) 120-180 mg capsule, Take by mouth., Disp: , Rfl:     lancets misc, Use to test blood sugar once daily NON INSULIN DEPENDENT, Disp: 100 each, Rfl: 3    PaxiL 40 mg tablet, Take 1 tablet (40 mg) by mouth once daily., Disp: , Rfl:     pioglitazone (Actos) 15 mg tablet, Take 1 tablet (15 mg) by mouth once daily., Disp: 30 tablet, Rfl: 11    atorvastatin (Lipitor) 20 mg tablet, Take 1 tablet (20 mg) by mouth once daily., Disp: 90 tablet, Rfl: 3    glipiZIDE XL (Glucotrol XL) 10 mg 24 hr tablet, Take 1 tablet (10 mg) by mouth once daily with breakfast., Disp: 90 tablet, Rfl: 3    irbesartan (Avapro) 75 mg tablet, Take 1 tablet (75 mg) by mouth once daily., Disp: 90 tablet, Rfl: 3    metFORMIN (Glucophage) 1,000 mg tablet, Take 1 tablet (1,000 mg) by mouth once daily., Disp: 90 tablet, Rfl: 3      Assessment/Plan   Diagnoses and all orders for this visit:  Breast cancer screening by mammogram  -     BI mammo bilateral screening tomosynthesis; Future  Type 2 diabetes mellitus without complication, without long-term current use of insulin (Multi)  -     POCT Glycosylated Hemoglobin (HGB A1C) docked device  -     metFORMIN (Glucophage) 1,000 mg tablet; Take 1 tablet (1,000 mg) by mouth once daily.  -     glipiZIDE XL (Glucotrol XL) 10 mg 24 hr tablet; Take 1 tablet (10 mg) by mouth once daily with breakfast.  Type 2 diabetes mellitus without complication, without long-term current use of insulin (Multi)  Comments:  doing well on pioglitazone, glipizide, and metformin  a1c improved 8.9% --> 7.6%  congratulated her efforts with her diet  Orders:  -     POCT Glycosylated Hemoglobin (HGB A1C) docked device  -     metFORMIN (Glucophage) 1,000 mg tablet; Take 1 tablet (1,000 mg) by mouth once daily.  -     glipiZIDE XL (Glucotrol XL) 10 mg 24 hr tablet; Take 1 tablet (10 mg) by  mouth once daily with breakfast.  Mixed hyperlipidemia  -     atorvastatin (Lipitor) 20 mg tablet; Take 1 tablet (20 mg) by mouth once daily.  Primary hypertension  Comments:  BP well controlled  Orders:  -     irbesartan (Avapro) 75 mg tablet; Take 1 tablet (75 mg) by mouth once daily.  Other orders  -     POCT GLYCOSYLATED HEMOGLOBIN (HGB A1C)    Follow up in 3 months       Nazia Dean DO 08/21/24 2:02 PM

## 2024-09-23 ENCOUNTER — APPOINTMENT (OUTPATIENT)
Dept: OTOLARYNGOLOGY | Facility: CLINIC | Age: 74
End: 2024-09-23
Payer: MEDICARE

## 2024-10-21 ENCOUNTER — APPOINTMENT (OUTPATIENT)
Dept: RADIOLOGY | Facility: CLINIC | Age: 74
End: 2024-10-21
Payer: MEDICARE

## 2024-10-24 ENCOUNTER — HOSPITAL ENCOUNTER (OUTPATIENT)
Dept: RADIOLOGY | Facility: CLINIC | Age: 74
Discharge: HOME | End: 2024-10-24
Payer: MEDICARE

## 2024-10-24 VITALS — WEIGHT: 210 LBS | HEIGHT: 65 IN | BODY MASS INDEX: 34.99 KG/M2

## 2024-10-24 DIAGNOSIS — Z12.31 BREAST CANCER SCREENING BY MAMMOGRAM: ICD-10-CM

## 2024-10-24 PROCEDURE — 77067 SCR MAMMO BI INCL CAD: CPT

## 2024-10-24 PROCEDURE — 77067 SCR MAMMO BI INCL CAD: CPT | Performed by: RADIOLOGY

## 2024-10-24 PROCEDURE — 77063 BREAST TOMOSYNTHESIS BI: CPT | Performed by: RADIOLOGY

## 2024-10-28 ENCOUNTER — OFFICE VISIT (OUTPATIENT)
Dept: OTOLARYNGOLOGY | Facility: CLINIC | Age: 74
End: 2024-10-28
Payer: MEDICARE

## 2024-10-28 VITALS
HEART RATE: 65 BPM | OXYGEN SATURATION: 97 % | SYSTOLIC BLOOD PRESSURE: 128 MMHG | BODY MASS INDEX: 36.65 KG/M2 | RESPIRATION RATE: 16 BRPM | TEMPERATURE: 98.1 F | HEIGHT: 65 IN | WEIGHT: 220 LBS | DIASTOLIC BLOOD PRESSURE: 58 MMHG

## 2024-10-28 DIAGNOSIS — Z85.810 HISTORY OF TONGUE CANCER: Primary | ICD-10-CM

## 2024-10-28 PROCEDURE — 99212 OFFICE O/P EST SF 10 MIN: CPT | Performed by: OTOLARYNGOLOGY

## 2024-10-28 PROCEDURE — 1036F TOBACCO NON-USER: CPT | Performed by: OTOLARYNGOLOGY

## 2024-10-28 PROCEDURE — 4010F ACE/ARB THERAPY RXD/TAKEN: CPT | Performed by: OTOLARYNGOLOGY

## 2024-10-28 PROCEDURE — 1159F MED LIST DOCD IN RCRD: CPT | Performed by: OTOLARYNGOLOGY

## 2024-10-28 PROCEDURE — 3051F HG A1C>EQUAL 7.0%<8.0%: CPT | Performed by: OTOLARYNGOLOGY

## 2024-10-28 PROCEDURE — 1126F AMNT PAIN NOTED NONE PRSNT: CPT | Performed by: OTOLARYNGOLOGY

## 2024-10-28 PROCEDURE — 3008F BODY MASS INDEX DOCD: CPT | Performed by: OTOLARYNGOLOGY

## 2024-10-28 PROCEDURE — 3078F DIAST BP <80 MM HG: CPT | Performed by: OTOLARYNGOLOGY

## 2024-10-28 PROCEDURE — 3074F SYST BP LT 130 MM HG: CPT | Performed by: OTOLARYNGOLOGY

## 2024-10-28 SDOH — ECONOMIC STABILITY: FOOD INSECURITY: WITHIN THE PAST 12 MONTHS, YOU WORRIED THAT YOUR FOOD WOULD RUN OUT BEFORE YOU GOT MONEY TO BUY MORE.: NEVER TRUE

## 2024-10-28 SDOH — ECONOMIC STABILITY: FOOD INSECURITY: WITHIN THE PAST 12 MONTHS, THE FOOD YOU BOUGHT JUST DIDN'T LAST AND YOU DIDN'T HAVE MONEY TO GET MORE.: NEVER TRUE

## 2024-10-28 ASSESSMENT — ENCOUNTER SYMPTOMS
DEPRESSION: 0
OCCASIONAL FEELINGS OF UNSTEADINESS: 0
LOSS OF SENSATION IN FEET: 0

## 2024-10-28 ASSESSMENT — COLUMBIA-SUICIDE SEVERITY RATING SCALE - C-SSRS
2. HAVE YOU ACTUALLY HAD ANY THOUGHTS OF KILLING YOURSELF?: NO
6. HAVE YOU EVER DONE ANYTHING, STARTED TO DO ANYTHING, OR PREPARED TO DO ANYTHING TO END YOUR LIFE?: NO
1. IN THE PAST MONTH, HAVE YOU WISHED YOU WERE DEAD OR WISHED YOU COULD GO TO SLEEP AND NOT WAKE UP?: NO

## 2024-10-28 ASSESSMENT — LIFESTYLE VARIABLES
AUDIT-C TOTAL SCORE: 0
HOW MANY STANDARD DRINKS CONTAINING ALCOHOL DO YOU HAVE ON A TYPICAL DAY: PATIENT DOES NOT DRINK
SKIP TO QUESTIONS 9-10: 1
HOW OFTEN DO YOU HAVE SIX OR MORE DRINKS ON ONE OCCASION: NEVER
HOW OFTEN DO YOU HAVE A DRINK CONTAINING ALCOHOL: NEVER

## 2024-10-28 ASSESSMENT — PAIN SCALES - GENERAL: PAINLEVEL_OUTOF10: 0-NO PAIN

## 2024-11-14 ENCOUNTER — ANCILLARY PROCEDURE (OUTPATIENT)
Dept: URGENT CARE | Age: 74
End: 2024-11-14
Payer: MEDICARE

## 2024-11-14 ENCOUNTER — OFFICE VISIT (OUTPATIENT)
Dept: URGENT CARE | Age: 74
End: 2024-11-14
Payer: MEDICARE

## 2024-11-14 VITALS
SYSTOLIC BLOOD PRESSURE: 158 MMHG | TEMPERATURE: 97.7 F | BODY MASS INDEX: 34.99 KG/M2 | WEIGHT: 210 LBS | HEIGHT: 65 IN | HEART RATE: 67 BPM | OXYGEN SATURATION: 97 % | DIASTOLIC BLOOD PRESSURE: 73 MMHG | RESPIRATION RATE: 16 BRPM

## 2024-11-14 DIAGNOSIS — R05.3 PERSISTENT COUGH: ICD-10-CM

## 2024-11-14 DIAGNOSIS — R05.3 PERSISTENT COUGH: Primary | ICD-10-CM

## 2024-11-14 PROCEDURE — 3008F BODY MASS INDEX DOCD: CPT | Performed by: PHYSICIAN ASSISTANT

## 2024-11-14 PROCEDURE — 99203 OFFICE O/P NEW LOW 30 MIN: CPT | Performed by: PHYSICIAN ASSISTANT

## 2024-11-14 PROCEDURE — 3077F SYST BP >= 140 MM HG: CPT | Performed by: PHYSICIAN ASSISTANT

## 2024-11-14 PROCEDURE — 1159F MED LIST DOCD IN RCRD: CPT | Performed by: PHYSICIAN ASSISTANT

## 2024-11-14 PROCEDURE — 1036F TOBACCO NON-USER: CPT | Performed by: PHYSICIAN ASSISTANT

## 2024-11-14 PROCEDURE — 3051F HG A1C>EQUAL 7.0%<8.0%: CPT | Performed by: PHYSICIAN ASSISTANT

## 2024-11-14 PROCEDURE — 3078F DIAST BP <80 MM HG: CPT | Performed by: PHYSICIAN ASSISTANT

## 2024-11-14 PROCEDURE — 4010F ACE/ARB THERAPY RXD/TAKEN: CPT | Performed by: PHYSICIAN ASSISTANT

## 2024-11-14 PROCEDURE — 71046 X-RAY EXAM CHEST 2 VIEWS: CPT | Performed by: PHYSICIAN ASSISTANT

## 2024-11-14 RX ORDER — AZITHROMYCIN 250 MG/1
TABLET, FILM COATED ORAL
Qty: 6 TABLET | Refills: 0 | Status: SHIPPED | OUTPATIENT
Start: 2024-11-14

## 2024-11-14 RX ORDER — ALBUTEROL SULFATE 90 UG/1
2 INHALANT RESPIRATORY (INHALATION) EVERY 6 HOURS PRN
Qty: 6.7 G | Refills: 0 | Status: SHIPPED | OUTPATIENT
Start: 2024-11-14 | End: 2025-11-14

## 2024-11-14 RX ORDER — BENZONATATE 200 MG/1
200 CAPSULE ORAL 3 TIMES DAILY PRN
Qty: 21 CAPSULE | Refills: 0 | Status: SHIPPED | OUTPATIENT
Start: 2024-11-14 | End: 2024-11-21

## 2024-11-14 NOTE — PATIENT INSTRUCTIONS
Warm liquids with honey  Increase fluid intake; encourage electrolytes such as Pedialyte  10 deep breaths an hour  May use tylenol/NSAIDs as needed for fevers, chills, body aches.

## 2024-11-14 NOTE — PROGRESS NOTES
Subjective   Patient ID: Tess Nava is a 74 y.o. female. They present today with a chief complaint of Cough (For two weeks as well as wheezing).    History of Present Illness  74-year-old patient presents to clinic with complaints of  productive cough with associated chest congestion, postnasal drip, wheezing ongoing for the past 2 weeks which initially started as  sinus congestion and cough which has since moved down. Reports history of asthma. Reports has tried Mucinex and antihistamines without relief.  Reports has had exposure to pneumonia.   Reports daughter and   Grandchild who exposed patient to pneumonia were placed on several antibiotics but the only one that worked with [facility]. Denies shortness of breath, chest pain, dizziness, fevers, chills, body aches, nausea, vomiting, abdominal pain, diarrhea.      Past Medical History  Allergies as of 11/14/2024 - Reviewed 11/14/2024   Allergen Reaction Noted    Hydrochlorothiazide Rash 05/08/2023       (Not in a hospital admission)       Past Medical History:   Diagnosis Date    Encounter for other screening for malignant neoplasm of breast 02/10/2021    Screening for breast cancer    Encounter for screening mammogram for malignant neoplasm of breast 07/21/2016    Other screening mammogram    Essential (primary) hypertension 02/10/2021    Benign essential hypertension    Essential (primary) hypertension 02/23/2021    Benign essential hypertension    Essential (primary) hypertension 10/19/2020    HTN (hypertension), benign    Essential (primary) hypertension 01/17/2019    HTN (hypertension), benign    Essential (primary) hypertension 01/17/2019    HTN (hypertension), benign    Essential (primary) hypertension 08/21/2020    HTN (hypertension), benign    Essential (primary) hypertension 09/12/2019    HTN (hypertension), benign    Fibrocystic breast     Other diseases of tongue 03/07/2019    Tongue lesion    Other specified symptoms and signs involving the  circulatory and respiratory systems 02/11/2019    Globus sensation    Personal history of malignant neoplasm of tongue 03/07/2019    History of tongue cancer    Personal history of other diseases of the circulatory system 03/28/2018    History of hypertension    Personal history of other diseases of the circulatory system 03/28/2018    History of hypertension    Personal history of other diseases of the circulatory system     History of hypertension    Personal history of other diseases of the digestive system 01/17/2019    History of glossitis    Personal history of other diseases of the digestive system 03/05/2019    History of oral lesions    Personal history of other diseases of the musculoskeletal system and connective tissue     History of arthritis    Personal history of other diseases of the respiratory system     History of asthma    Personal history of other drug therapy 10/23/2019    History of influenza vaccination    Personal history of other endocrine, nutritional and metabolic disease 09/12/2019    History of diabetes mellitus    Personal history of other endocrine, nutritional and metabolic disease 09/12/2019    History of diabetes mellitus    Personal history of other endocrine, nutritional and metabolic disease 12/18/2019    History of type 2 diabetes mellitus    Personal history of other endocrine, nutritional and metabolic disease 02/23/2021    History of type 2 diabetes mellitus    Personal history of other endocrine, nutritional and metabolic disease 02/28/2018    History of diabetes mellitus    Personal history of other endocrine, nutritional and metabolic disease 09/01/2020    History of hyperkalemia    Personal history of other endocrine, nutritional and metabolic disease 10/19/2020    History of type 2 diabetes mellitus    Personal history of other endocrine, nutritional and metabolic disease 03/28/2018    History of type 2 diabetes mellitus    Personal history of other endocrine,  nutritional and metabolic disease 10/23/2019    History of type 2 diabetes mellitus    Personal history of other endocrine, nutritional and metabolic disease 2022    History of type 2 diabetes mellitus    Personal history of other endocrine, nutritional and metabolic disease 10/15/2020    History of type 2 diabetes mellitus    Personal history of other endocrine, nutritional and metabolic disease 2018    History of type 2 diabetes mellitus    Personal history of other endocrine, nutritional and metabolic disease 2018    History of type 2 diabetes mellitus    Personal history of other endocrine, nutritional and metabolic disease 2021    History of type 2 diabetes mellitus    Personal history of other infectious and parasitic diseases 10/19/2020    History of Helicobacter pylori infection    Personal history of other infectious and parasitic diseases 10/15/2020    History of Helicobacter pylori infection    Personal history of other infectious and parasitic diseases     History of varicella    Personal history of other infectious and parasitic diseases     H/O scarlet fever    Personal history of other medical treatment 2018    History of screening mammography    Personal history of other specified conditions     History of vertigo    Tinea unguium 2015    Dermatophytosis, nail       Past Surgical History:   Procedure Laterality Date    BREAST SURGERY Left     breast abscess removed     SECTION, CLASSIC  2021     Section    CHOLECYSTECTOMY  10/23/2017    Cholecystectomy Laparoscopic    DILATION AND CURETTAGE OF UTERUS  2018    Dilation And Curettage    OTHER SURGICAL HISTORY  2022    Hemiglossectomy    OTHER SURGICAL HISTORY  2018    Hysteroscopy    OTHER SURGICAL HISTORY  2021    Cataract surgery    OTHER SURGICAL HISTORY  2021    Complete colonoscopy        reports that she has never smoked. She has never used smokeless tobacco.  "She reports that she does not drink alcohol and does not use drugs.    Review of Systems  ROS negative with the exception as noted on HPI                                Objective    Vitals:    11/14/24 1123   BP: 158/73   BP Location: Right arm   Patient Position: Sitting   BP Cuff Size: Adult   Pulse: 67   Resp: 16   Temp: 36.5 °C (97.7 °F)   TempSrc: Oral   SpO2: 97%   Weight: 95.3 kg (210 lb)   Height: 1.651 m (5' 5\")     No LMP recorded. Patient is postmenopausal.    Physical Exam  Constitutional:       Appearance: Normal appearance.   HENT:      Head: Normocephalic and atraumatic.      Right Ear: Tympanic membrane, ear canal and external ear normal.      Left Ear: Tympanic membrane, ear canal and external ear normal.      Nose: Mucosal edema present. No rhinorrhea.      Right Sinus: No maxillary sinus tenderness or frontal sinus tenderness.      Left Sinus: No maxillary sinus tenderness or frontal sinus tenderness.      Mouth/Throat:      Lips: Pink.      Mouth: Mucous membranes are moist. No oral lesions.      Dentition: Normal dentition. No gingival swelling.      Tongue: No lesions. Tongue does not deviate from midline.      Palate: No mass and lesions.      Pharynx: Posterior oropharyngeal erythema and postnasal drip present. No pharyngeal swelling or uvula swelling.   Cardiovascular:      Rate and Rhythm: Normal rate and regular rhythm.      Heart sounds: No murmur heard.  Pulmonary:      Effort: Pulmonary effort is normal. No respiratory distress.      Breath sounds: Normal breath sounds. No stridor. No wheezing, rhonchi or rales.   Neurological:      Mental Status: She is alert.         Procedures    Point of Care Test & Imaging Results from this visit  No results found for this visit on 11/14/24.   XR chest 2 views    Result Date: 11/14/2024  Interpreted By:  Natalya Cohen, STUDY: XR CHEST 2 VIEWS;  11/14/2024 11:39 am   INDICATION: Signs/Symptoms:cough x 2 weeks. exposure to pneumonia.   ,R05.3 " Chronic cough   COMPARISON: 09/13/2022   ACCESSION NUMBER(S): [id number]   ORDERING CLINICIAN: MICHAELA SHAVER   FINDINGS:         CARDIOMEDIASTINAL SILHOUETTE: Cardiomediastinal silhouette is normal in size and configuration.   LUNGS: Lungs are clear.   ABDOMEN: No remarkable upper abdominal findings.   BONES: No acute osseous changes.       1.  No evidence of acute cardiopulmonary process.       MACRO: None   Signed by: Natalya Cohen 11/14/2024 12:02 PM Dictation workstation:   [id number]     Diagnostic study results (if any) were reviewed by Michaela Shaver PA-C.    Assessment/Plan   Allergies, medications, history, and pertinent labs/EKGs/Imaging reviewed by Michaela Shaver PA-C.   productive cough with associated chest congestion, postnasal drip, wheezing ongoing for the past 2 weeks which initially started as  sinus congestion and cough which has since moved down.   Z-Dmitry started.  Albuterol inhaler prescribed. Tessalon Perles started for cough. Advised to drink plenty of fluids, run a cool-mist humidifier in room at night, gargle salt water for sore throat, and get plenty of rest. Pt. is advised to take 10 deep breaths and hours and continue to walk around every couple of hours. Patient should avoid over-exertion and reduce exposure to irritants such as smoke, cold, dry air, and dust. Patient may take acetaminophen or ibuprofen as directed to reduce fever and body aches. Antihistamine and decongestant usage was discussed and recommendations made. Risk, benefits, and potential side effects of medication(s) discussed with pt. Discussed disease/illness presentation, treatment options, progression, complications, and outcomes with patient. Pt. Has expressed understanding and is an agreement of plan of care.    Medical Decision Making      Orders and Diagnoses  Diagnoses and all orders for this visit:  Persistent cough  -     XR chest 2 views; Future      Medical Admin Record      Patient  disposition: Home    Electronically signed by Michaela Abdi PA-C  12:07 PM

## 2024-11-20 ENCOUNTER — APPOINTMENT (OUTPATIENT)
Dept: PRIMARY CARE | Facility: CLINIC | Age: 74
End: 2024-11-20
Payer: MEDICARE

## 2024-11-20 VITALS
SYSTOLIC BLOOD PRESSURE: 130 MMHG | OXYGEN SATURATION: 97 % | HEART RATE: 63 BPM | WEIGHT: 220 LBS | BODY MASS INDEX: 36.61 KG/M2 | DIASTOLIC BLOOD PRESSURE: 70 MMHG

## 2024-11-20 DIAGNOSIS — Z13.29 THYROID DISORDER SCREENING: ICD-10-CM

## 2024-11-20 DIAGNOSIS — J06.9 UPPER RESPIRATORY TRACT INFECTION, UNSPECIFIED TYPE: ICD-10-CM

## 2024-11-20 DIAGNOSIS — E11.9 TYPE 2 DIABETES MELLITUS WITHOUT COMPLICATION, WITHOUT LONG-TERM CURRENT USE OF INSULIN (MULTI): Primary | Chronic | ICD-10-CM

## 2024-11-20 LAB
ALBUMIN SERPL BCP-MCNC: 4.1 G/DL (ref 3.4–5)
ALP SERPL-CCNC: 82 U/L (ref 33–136)
ALT SERPL W P-5'-P-CCNC: 9 U/L (ref 7–45)
ANION GAP SERPL CALC-SCNC: 17 MMOL/L (ref 10–20)
AST SERPL W P-5'-P-CCNC: 18 U/L (ref 9–39)
BILIRUB SERPL-MCNC: 0.9 MG/DL (ref 0–1.2)
BUN SERPL-MCNC: 33 MG/DL (ref 6–23)
CALCIUM SERPL-MCNC: 8.9 MG/DL (ref 8.6–10.6)
CHLORIDE SERPL-SCNC: 105 MMOL/L (ref 98–107)
CO2 SERPL-SCNC: 22 MMOL/L (ref 21–32)
CREAT SERPL-MCNC: 1.2 MG/DL (ref 0.5–1.05)
EGFRCR SERPLBLD CKD-EPI 2021: 48 ML/MIN/1.73M*2
ERYTHROCYTE [DISTWIDTH] IN BLOOD BY AUTOMATED COUNT: 13.2 % (ref 11.5–14.5)
EST. AVERAGE GLUCOSE BLD GHB EST-MCNC: 166 MG/DL
GLUCOSE SERPL-MCNC: 116 MG/DL (ref 74–99)
HBA1C MFR BLD: 7.4 %
HCT VFR BLD AUTO: 37.9 % (ref 36–46)
HGB BLD-MCNC: 12 G/DL (ref 12–16)
MCH RBC QN AUTO: 29.5 PG (ref 26–34)
MCHC RBC AUTO-ENTMCNC: 31.7 G/DL (ref 32–36)
MCV RBC AUTO: 93 FL (ref 80–100)
NRBC BLD-RTO: 0 /100 WBCS (ref 0–0)
PLATELET # BLD AUTO: 258 X10*3/UL (ref 150–450)
POTASSIUM SERPL-SCNC: 5.7 MMOL/L (ref 3.5–5.3)
PROT SERPL-MCNC: 6.9 G/DL (ref 6.4–8.2)
RBC # BLD AUTO: 4.07 X10*6/UL (ref 4–5.2)
SODIUM SERPL-SCNC: 138 MMOL/L (ref 136–145)
TSH SERPL-ACNC: 2.76 MIU/L (ref 0.44–3.98)
WBC # BLD AUTO: 5.7 X10*3/UL (ref 4.4–11.3)

## 2024-11-20 PROCEDURE — 1160F RVW MEDS BY RX/DR IN RCRD: CPT | Performed by: STUDENT IN AN ORGANIZED HEALTH CARE EDUCATION/TRAINING PROGRAM

## 2024-11-20 PROCEDURE — 80053 COMPREHEN METABOLIC PANEL: CPT

## 2024-11-20 PROCEDURE — 4010F ACE/ARB THERAPY RXD/TAKEN: CPT | Performed by: STUDENT IN AN ORGANIZED HEALTH CARE EDUCATION/TRAINING PROGRAM

## 2024-11-20 PROCEDURE — 83036 HEMOGLOBIN GLYCOSYLATED A1C: CPT

## 2024-11-20 PROCEDURE — 85027 COMPLETE CBC AUTOMATED: CPT

## 2024-11-20 PROCEDURE — 3078F DIAST BP <80 MM HG: CPT | Performed by: STUDENT IN AN ORGANIZED HEALTH CARE EDUCATION/TRAINING PROGRAM

## 2024-11-20 PROCEDURE — 3051F HG A1C>EQUAL 7.0%<8.0%: CPT | Performed by: STUDENT IN AN ORGANIZED HEALTH CARE EDUCATION/TRAINING PROGRAM

## 2024-11-20 PROCEDURE — 1036F TOBACCO NON-USER: CPT | Performed by: STUDENT IN AN ORGANIZED HEALTH CARE EDUCATION/TRAINING PROGRAM

## 2024-11-20 PROCEDURE — 1159F MED LIST DOCD IN RCRD: CPT | Performed by: STUDENT IN AN ORGANIZED HEALTH CARE EDUCATION/TRAINING PROGRAM

## 2024-11-20 PROCEDURE — 3075F SYST BP GE 130 - 139MM HG: CPT | Performed by: STUDENT IN AN ORGANIZED HEALTH CARE EDUCATION/TRAINING PROGRAM

## 2024-11-20 PROCEDURE — 84443 ASSAY THYROID STIM HORMONE: CPT

## 2024-11-20 PROCEDURE — 99213 OFFICE O/P EST LOW 20 MIN: CPT | Performed by: STUDENT IN AN ORGANIZED HEALTH CARE EDUCATION/TRAINING PROGRAM

## 2024-11-20 ASSESSMENT — ENCOUNTER SYMPTOMS
CHEST TIGHTNESS: 0
SHORTNESS OF BREATH: 0
LIGHT-HEADEDNESS: 0
DIZZINESS: 0
ABDOMINAL PAIN: 0
WHEEZING: 0
SLEEP DISTURBANCE: 0
DYSPHORIC MOOD: 0
CHOKING: 1
CONSTIPATION: 0
FATIGUE: 0
HEADACHES: 0
NERVOUS/ANXIOUS: 0
DIARRHEA: 0
UNEXPECTED WEIGHT CHANGE: 0
PALPITATIONS: 0
SINUS PRESSURE: 0
DIFFICULTY URINATING: 0

## 2024-11-20 ASSESSMENT — PATIENT HEALTH QUESTIONNAIRE - PHQ9
SUM OF ALL RESPONSES TO PHQ9 QUESTIONS 1 AND 2: 0
1. LITTLE INTEREST OR PLEASURE IN DOING THINGS: NOT AT ALL
2. FEELING DOWN, DEPRESSED OR HOPELESS: NOT AT ALL

## 2024-11-20 NOTE — PROGRESS NOTES
Subjective   Patient ID: Tess Nava is a 74 y.o. female who presents for URI (Has cough- went to urgent care last week and was on abx for this /Was given tessalon pearles as well).  Reports her 2 grandkids had mycoplasma pneumonia. Went to urgent care and was treated with azithromycin. Cough much better.     Last A1c 7.6%. Taking her meds as prescribed. Reports blood sugar between 110-180.     Appetite and digestion normal.     Reports has been out of her exercise routine.     No other concerns today.         Review of Systems   Constitutional:  Negative for fatigue and unexpected weight change.   HENT:  Negative for congestion, ear pain and sinus pressure.    Eyes:  Negative for visual disturbance.   Respiratory:  Positive for choking. Negative for chest tightness, shortness of breath and wheezing.    Cardiovascular:  Negative for chest pain, palpitations and leg swelling.   Gastrointestinal:  Negative for abdominal pain, constipation and diarrhea.   Genitourinary:  Negative for difficulty urinating.   Skin:  Negative for rash.   Neurological:  Negative for dizziness, light-headedness and headaches.   Psychiatric/Behavioral:  Negative for dysphoric mood and sleep disturbance. The patient is not nervous/anxious.    All other systems reviewed and are negative.      Objective   Physical Exam  Vitals reviewed.   Constitutional:       General: She is not in acute distress.     Appearance: Normal appearance.   HENT:      Head: Normocephalic.      Mouth/Throat:      Mouth: Mucous membranes are moist.   Eyes:      Pupils: Pupils are equal, round, and reactive to light.   Cardiovascular:      Rate and Rhythm: Normal rate and regular rhythm.   Pulmonary:      Effort: Pulmonary effort is normal. No respiratory distress.   Musculoskeletal:         General: Normal range of motion.   Skin:     General: Skin is warm and dry.   Neurological:      Mental Status: She is alert. Mental status is at baseline.   Psychiatric:          Mood and Affect: Mood normal.         Behavior: Behavior normal.         Body mass index is 36.61 kg/m².      Current Outpatient Medications:     albuterol 90 mcg/actuation inhaler, Inhale 2 puffs every 6 hours if needed for wheezing., Disp: 6.7 g, Rfl: 0    amLODIPine (Norvasc) 10 mg tablet, Take 1 tablet (10 mg) by mouth once daily., Disp: 90 tablet, Rfl: 3    aspirin-calcium carbonate 81 mg-300 mg calcium(777 mg) tablet, Take 1 tablet by mouth once daily., Disp: , Rfl:     atorvastatin (Lipitor) 20 mg tablet, Take 1 tablet (20 mg) by mouth once daily., Disp: 90 tablet, Rfl: 3    benzonatate (Tessalon) 200 mg capsule, Take 1 capsule (200 mg) by mouth 3 times a day as needed for cough for up to 7 days. Do not crush or chew., Disp: 21 capsule, Rfl: 0    Blood glucose monitoring meter kit (Blood Glucose Monitoring) kit, Use to test blood sugar *Non insulin dependent*, Disp: 1 each, Rfl: 0    blood sugar diagnostic (Blood Glucose Test) strip, Use to test blood sugar once daily NON INSULIN DEPENDANT, Disp: 100 strip, Rfl: 3    cholecalciferol (Vitamin D-3) 50 mcg (2,000 unit) capsule, Take by mouth per week., Disp: , Rfl:     CHROMIUM ORAL, Take 800 mg by mouth once daily., Disp: , Rfl:     fish oil concentrate (Omega-3) 120-180 mg capsule, Take by mouth., Disp: , Rfl:     glipiZIDE XL (Glucotrol XL) 10 mg 24 hr tablet, Take 1 tablet (10 mg) by mouth once daily with breakfast., Disp: 90 tablet, Rfl: 3    irbesartan (Avapro) 75 mg tablet, Take 1 tablet (75 mg) by mouth once daily., Disp: 90 tablet, Rfl: 3    lancets misc, Use to test blood sugar once daily NON INSULIN DEPENDENT, Disp: 100 each, Rfl: 3    metFORMIN (Glucophage) 1,000 mg tablet, Take 1 tablet (1,000 mg) by mouth once daily., Disp: 90 tablet, Rfl: 3    PaxiL 40 mg tablet, Take 1 tablet (40 mg) by mouth once daily., Disp: , Rfl:     pioglitazone (Actos) 15 mg tablet, Take 1 tablet (15 mg) by mouth once daily., Disp: 30 tablet, Rfl: 11      Assessment/Plan    Diagnoses and all orders for this visit:  Type 2 diabetes mellitus without complication, without long-term current use of insulin (Multi)  Comments:  last a1c 7.6%  check a1c  Orders:  -     Comprehensive Metabolic Panel  -     CBC  -     Hemoglobin A1c  Thyroid disorder screening  -     TSH with reflex to Free T4 if abnormal  Upper respiratory tract infection, unspecified type  Comments:  symptoms improving  continue supportive care     Follow up in 3 months    Nazia Dean DO 11/20/24 2:47 PM

## 2025-02-26 ENCOUNTER — APPOINTMENT (OUTPATIENT)
Dept: PRIMARY CARE | Facility: CLINIC | Age: 75
End: 2025-02-26
Payer: MEDICARE

## 2025-02-26 VITALS
BODY MASS INDEX: 37.32 KG/M2 | HEART RATE: 62 BPM | WEIGHT: 224 LBS | HEIGHT: 65 IN | DIASTOLIC BLOOD PRESSURE: 50 MMHG | OXYGEN SATURATION: 99 % | SYSTOLIC BLOOD PRESSURE: 130 MMHG

## 2025-02-26 DIAGNOSIS — E11.9 TYPE 2 DIABETES MELLITUS WITHOUT COMPLICATION, WITHOUT LONG-TERM CURRENT USE OF INSULIN (MULTI): Primary | Chronic | ICD-10-CM

## 2025-02-26 DIAGNOSIS — N18.30 STAGE 3 CHRONIC KIDNEY DISEASE, UNSPECIFIED WHETHER STAGE 3A OR 3B CKD (MULTI): ICD-10-CM

## 2025-02-26 DIAGNOSIS — R41.3 MEMORY CHANGES: ICD-10-CM

## 2025-02-26 DIAGNOSIS — E66.01 OBESITY, MORBID (MULTI): ICD-10-CM

## 2025-02-26 PROBLEM — C02.9 CANCER OF TONGUE (MULTI): Status: RESOLVED | Noted: 2023-08-09 | Resolved: 2025-02-26

## 2025-02-26 LAB — HBA1C MFR BLD: 7.6 % (ref 4.2–6.5)

## 2025-02-26 PROCEDURE — 99213 OFFICE O/P EST LOW 20 MIN: CPT | Performed by: STUDENT IN AN ORGANIZED HEALTH CARE EDUCATION/TRAINING PROGRAM

## 2025-02-26 PROCEDURE — 1159F MED LIST DOCD IN RCRD: CPT | Performed by: STUDENT IN AN ORGANIZED HEALTH CARE EDUCATION/TRAINING PROGRAM

## 2025-02-26 PROCEDURE — 3051F HG A1C>EQUAL 7.0%<8.0%: CPT | Performed by: STUDENT IN AN ORGANIZED HEALTH CARE EDUCATION/TRAINING PROGRAM

## 2025-02-26 PROCEDURE — 83036 HEMOGLOBIN GLYCOSYLATED A1C: CPT | Mod: CLIA WAIVED TEST | Performed by: STUDENT IN AN ORGANIZED HEALTH CARE EDUCATION/TRAINING PROGRAM

## 2025-02-26 PROCEDURE — 1036F TOBACCO NON-USER: CPT | Performed by: STUDENT IN AN ORGANIZED HEALTH CARE EDUCATION/TRAINING PROGRAM

## 2025-02-26 PROCEDURE — 4010F ACE/ARB THERAPY RXD/TAKEN: CPT | Performed by: STUDENT IN AN ORGANIZED HEALTH CARE EDUCATION/TRAINING PROGRAM

## 2025-02-26 PROCEDURE — 3008F BODY MASS INDEX DOCD: CPT | Performed by: STUDENT IN AN ORGANIZED HEALTH CARE EDUCATION/TRAINING PROGRAM

## 2025-02-26 PROCEDURE — 1160F RVW MEDS BY RX/DR IN RCRD: CPT | Performed by: STUDENT IN AN ORGANIZED HEALTH CARE EDUCATION/TRAINING PROGRAM

## 2025-02-26 PROCEDURE — G2211 COMPLEX E/M VISIT ADD ON: HCPCS | Performed by: STUDENT IN AN ORGANIZED HEALTH CARE EDUCATION/TRAINING PROGRAM

## 2025-02-26 PROCEDURE — 3075F SYST BP GE 130 - 139MM HG: CPT | Performed by: STUDENT IN AN ORGANIZED HEALTH CARE EDUCATION/TRAINING PROGRAM

## 2025-02-26 PROCEDURE — 3078F DIAST BP <80 MM HG: CPT | Performed by: STUDENT IN AN ORGANIZED HEALTH CARE EDUCATION/TRAINING PROGRAM

## 2025-02-26 RX ORDER — PIOGLITAZONEHYDROCHLORIDE 15 MG/1
15 TABLET ORAL DAILY
Qty: 90 TABLET | Refills: 3 | Status: SHIPPED | OUTPATIENT
Start: 2025-02-26 | End: 2025-08-25

## 2025-02-26 ASSESSMENT — ENCOUNTER SYMPTOMS
DYSPHORIC MOOD: 0
HEADACHES: 0
SLEEP DISTURBANCE: 0
ACTIVITY CHANGE: 0
DIZZINESS: 0
SHORTNESS OF BREATH: 0
MUSCULOSKELETAL NEGATIVE: 1
GASTROINTESTINAL NEGATIVE: 1
CHEST TIGHTNESS: 0
FATIGUE: 0

## 2025-02-26 NOTE — PROGRESS NOTES
Subjective   Patient ID: Tess Nava is a 75 y.o. female who presents for Follow-up (A1c follow up ).  Last A1c 7.6%.     Blood sugars .     Gained a few pounds from being less active in the winter.     Wants to work on increasing her activity levels.     Her mother had suspected Alzheimers and was on Aricept. She has been noticing some memory changes in herself. Interested in potential testing. Denies any issues with safety.     Has been told she snores. No prior sleep study.     No other concerns today.         Review of Systems   Constitutional:  Negative for activity change and fatigue.   HENT: Negative.     Respiratory:  Negative for chest tightness and shortness of breath.    Cardiovascular:  Negative for chest pain.   Gastrointestinal: Negative.    Musculoskeletal: Negative.    Neurological:  Negative for dizziness and headaches.   Psychiatric/Behavioral:  Negative for dysphoric mood and sleep disturbance.    All other systems reviewed and are negative.      Objective   Physical Exam  Vitals reviewed.   Constitutional:       General: She is not in acute distress.     Appearance: Normal appearance.   HENT:      Head: Normocephalic.   Eyes:      Pupils: Pupils are equal, round, and reactive to light.   Cardiovascular:      Rate and Rhythm: Normal rate and regular rhythm.   Pulmonary:      Effort: Pulmonary effort is normal. No respiratory distress.   Musculoskeletal:         General: Normal range of motion.   Skin:     General: Skin is warm and dry.   Neurological:      Mental Status: She is alert. Mental status is at baseline.   Psychiatric:         Mood and Affect: Mood normal.         Behavior: Behavior normal.         Body mass index is 37.28 kg/m².      Current Outpatient Medications:     albuterol 90 mcg/actuation inhaler, Inhale 2 puffs every 6 hours if needed for wheezing., Disp: 6.7 g, Rfl: 0    amLODIPine (Norvasc) 10 mg tablet, Take 1 tablet (10 mg) by mouth once daily., Disp: 90 tablet,  Rfl: 3    aspirin-calcium carbonate 81 mg-300 mg calcium(777 mg) tablet, Take 1 tablet by mouth once daily., Disp: , Rfl:     atorvastatin (Lipitor) 20 mg tablet, Take 1 tablet (20 mg) by mouth once daily., Disp: 90 tablet, Rfl: 3    Blood glucose monitoring meter kit (Blood Glucose Monitoring) kit, Use to test blood sugar *Non insulin dependent*, Disp: 1 each, Rfl: 0    blood sugar diagnostic (Blood Glucose Test) strip, Use to test blood sugar once daily NON INSULIN DEPENDANT, Disp: 100 strip, Rfl: 3    cholecalciferol (Vitamin D-3) 50 mcg (2,000 unit) capsule, Take by mouth per week., Disp: , Rfl:     CHROMIUM ORAL, Take 800 mg by mouth once daily., Disp: , Rfl:     fish oil concentrate (Omega-3) 120-180 mg capsule, Take by mouth., Disp: , Rfl:     glipiZIDE XL (Glucotrol XL) 10 mg 24 hr tablet, Take 1 tablet (10 mg) by mouth once daily with breakfast., Disp: 90 tablet, Rfl: 3    irbesartan (Avapro) 75 mg tablet, Take 1 tablet (75 mg) by mouth once daily., Disp: 90 tablet, Rfl: 3    lancets misc, Use to test blood sugar once daily NON INSULIN DEPENDENT, Disp: 100 each, Rfl: 3    metFORMIN (Glucophage) 1,000 mg tablet, Take 1 tablet (1,000 mg) by mouth once daily., Disp: 90 tablet, Rfl: 3    PaxiL 40 mg tablet, Take 1 tablet (40 mg) by mouth once daily., Disp: , Rfl:     pioglitazone (Actos) 15 mg tablet, Take 1 tablet (15 mg) by mouth once daily., Disp: 90 tablet, Rfl: 3    Assessment/Plan   Diagnoses and all orders for this visit:  Type 2 diabetes mellitus without complication, without long-term current use of insulin (Multi)  Comments:  a1c stable at 7.6%  taking her meds as prescribed  working on watching her diet  wants to exercise more  Orders:  -     POCT Glycosylated Hemoglobin (HGB A1C) docked device  -     pioglitazone (Actos) 15 mg tablet; Take 1 tablet (15 mg) by mouth once daily.  Obesity, morbid (Multi)  Comments:  encouraged working on diet  Stage 3 chronic kidney disease, unspecified whether stage  3a or 3b CKD (Multi)  Memory changes  Comments:  discussed likely age related cognitive changes  continue working on lowering risk factors  if noticing progression consider neuropsych eval  Other orders  -     POCT GLYCOSYLATED HEMOGLOBIN (HGB A1C)       Follow up in 3 months for medicare wellness    Nazia Dean DO 02/26/25 4:33 PM

## 2025-05-06 ENCOUNTER — APPOINTMENT (OUTPATIENT)
Dept: PRIMARY CARE | Facility: CLINIC | Age: 75
End: 2025-05-06
Payer: MEDICARE

## 2025-05-06 VITALS
DIASTOLIC BLOOD PRESSURE: 64 MMHG | HEART RATE: 67 BPM | SYSTOLIC BLOOD PRESSURE: 150 MMHG | HEIGHT: 65 IN | BODY MASS INDEX: 38.15 KG/M2 | OXYGEN SATURATION: 97 % | WEIGHT: 229 LBS

## 2025-05-06 DIAGNOSIS — M25.511 CHRONIC PAIN OF BOTH SHOULDERS: Primary | ICD-10-CM

## 2025-05-06 DIAGNOSIS — M79.10 MUSCLE SORENESS: ICD-10-CM

## 2025-05-06 DIAGNOSIS — M25.551 BILATERAL HIP PAIN: ICD-10-CM

## 2025-05-06 DIAGNOSIS — M25.512 CHRONIC PAIN OF BOTH SHOULDERS: Primary | ICD-10-CM

## 2025-05-06 DIAGNOSIS — E11.9 TYPE 2 DIABETES MELLITUS WITHOUT COMPLICATION, WITHOUT LONG-TERM CURRENT USE OF INSULIN: ICD-10-CM

## 2025-05-06 DIAGNOSIS — G89.29 CHRONIC PAIN OF BOTH SHOULDERS: Primary | ICD-10-CM

## 2025-05-06 DIAGNOSIS — M25.552 BILATERAL HIP PAIN: ICD-10-CM

## 2025-05-06 PROCEDURE — 3077F SYST BP >= 140 MM HG: CPT | Performed by: STUDENT IN AN ORGANIZED HEALTH CARE EDUCATION/TRAINING PROGRAM

## 2025-05-06 PROCEDURE — 3078F DIAST BP <80 MM HG: CPT | Performed by: STUDENT IN AN ORGANIZED HEALTH CARE EDUCATION/TRAINING PROGRAM

## 2025-05-06 PROCEDURE — 4010F ACE/ARB THERAPY RXD/TAKEN: CPT | Performed by: STUDENT IN AN ORGANIZED HEALTH CARE EDUCATION/TRAINING PROGRAM

## 2025-05-06 PROCEDURE — 99214 OFFICE O/P EST MOD 30 MIN: CPT | Performed by: STUDENT IN AN ORGANIZED HEALTH CARE EDUCATION/TRAINING PROGRAM

## 2025-05-06 PROCEDURE — 3008F BODY MASS INDEX DOCD: CPT | Performed by: STUDENT IN AN ORGANIZED HEALTH CARE EDUCATION/TRAINING PROGRAM

## 2025-05-06 PROCEDURE — 1036F TOBACCO NON-USER: CPT | Performed by: STUDENT IN AN ORGANIZED HEALTH CARE EDUCATION/TRAINING PROGRAM

## 2025-05-06 PROCEDURE — 1159F MED LIST DOCD IN RCRD: CPT | Performed by: STUDENT IN AN ORGANIZED HEALTH CARE EDUCATION/TRAINING PROGRAM

## 2025-05-06 PROCEDURE — 3051F HG A1C>EQUAL 7.0%<8.0%: CPT | Performed by: STUDENT IN AN ORGANIZED HEALTH CARE EDUCATION/TRAINING PROGRAM

## 2025-05-06 PROCEDURE — 1160F RVW MEDS BY RX/DR IN RCRD: CPT | Performed by: STUDENT IN AN ORGANIZED HEALTH CARE EDUCATION/TRAINING PROGRAM

## 2025-05-06 ASSESSMENT — PATIENT HEALTH QUESTIONNAIRE - PHQ9
1. LITTLE INTEREST OR PLEASURE IN DOING THINGS: NOT AT ALL
2. FEELING DOWN, DEPRESSED OR HOPELESS: NOT AT ALL
SUM OF ALL RESPONSES TO PHQ9 QUESTIONS 1 AND 2: 0

## 2025-05-06 NOTE — PROGRESS NOTES
Subjective   Patient ID: Tess Nava is a 75 y.o. female who presents for Hip Pain and Shoulder Pain (Hip and shoulder pain that has been progressively worse. Pain is the worst in the morning. Was doing some yard work and felt like she was in intense pain following doing that. Does yoga class and at her last class she was having trouble moving her arm upwards ).  History of Present Illness  Tess Nava is a 75 year old female who presents with worsening musculoskeletal pain and fatigue.    Over the past month, she has experienced a progressive increase in musculoskeletal pain, particularly affecting her back, shoulders, and hips. The pain is described as a dull heaviness affecting the muscles rather than the joints, severe enough to impact daily activities such as gardening and household chores. She often feels drained and short of breath after minimal exertion. Significant morning stiffness is present, making it difficult to move her legs and perform basic tasks. Difficulty sleeping due to discomfort is noted, requiring frequent position changes for relief.    She attends yoga classes three times a week but finds the activity increasingly painful, especially when lifting her arms. Despite a high pain tolerance, she almost cried from the pain during a recent session. A lack of energy and motivation is reported, with tasks like cleaning the bathroom sink taking a week to complete. She has been taking Advil for the past week, which provides some relief but does not fully alleviate the soreness.    She has noticed a weight gain of about five pounds over the past three months despite monitoring her diet and blood sugar levels. Blood sugar readings have been stable, with no recent spikes into the 200s. Swelling in her hands affects her , which she attributes to worsening arthritis.    Family history is significant for her  having had polymyalgia rheumatica about fifteen years ago, presenting with similar  "symptoms of muscle pain and stiffness. No jaw pain or cramping when chewing, but she mentions a recent toothache. No rash on her shoulders or neck. No significant swelling in her ankles but notes swelling in her hands. An inhaler is occasionally used when feeling particularly short of breath, which she finds somewhat helpful.    Review of Systems  ROS otherwise negative aside from what was mentioned above in HPI.    Objective     /64 (BP Location: Left arm, Patient Position: Sitting, BP Cuff Size: Adult)   Pulse 67   Ht 1.651 m (5' 5\")   Wt 104 kg (229 lb)   SpO2 97%   BMI 38.11 kg/m²      Current Outpatient Medications   Medication Instructions    albuterol 90 mcg/actuation inhaler 2 puffs, inhalation, Every 6 hours PRN    amLODIPine (NORVASC) 10 mg, oral, Daily    aspirin-calcium carbonate 81 mg-300 mg calcium(777 mg) tablet 1 tablet, Daily    atorvastatin (LIPITOR) 20 mg, oral, Daily    Blood glucose monitoring meter kit (Blood Glucose Monitoring) kit Use to test blood sugar *Non insulin dependent*    blood sugar diagnostic (Blood Glucose Test) strip Use to test blood sugar once daily NON INSULIN DEPENDANT    cholecalciferol (Vitamin D-3) 50 mcg (2,000 unit) capsule Weekly    CHROMIUM ORAL 800 mg, Daily    fish oil concentrate (Omega-3) 120-180 mg capsule Take by mouth.    glipiZIDE XL (GLUCOTROL XL) 10 mg, oral, Daily with breakfast    irbesartan (AVAPRO) 75 mg, oral, Daily    lancets misc Use to test blood sugar once daily NON INSULIN DEPENDENT    metFORMIN (GLUCOPHAGE) 1,000 mg, oral, Daily    PaxiL 40 mg tablet 1 tablet, Daily    pioglitazone (ACTOS) 15 mg, oral, Daily       Physical Exam  GENERAL: Alert, cooperative, well developed, no acute distress.  HEENT: Normocephalic, normal oropharynx, moist mucous membranes.  NECK: Thyroid normal to palpation.  CHEST: Clear to auscultation bilaterally, no wheezes, rhonchi, or crackles.  CV: Normal heart rate and rhythm, S1 and S2 normal without " murmurs.  ABDOMEN: Soft, non-tender, non-distended.  SKIN: No rashes or lesions.  NEURO: Cranial nerves grossly intact, moves all extremities without gross motor impairment, normal ROM.    Results  LABS  Blood Glucose: 102, 110, 95, 170  HbA1c: 7.6%    Assessment & Plan  Polymyalgia Rheumatica (PMR)  Progressive musculoskeletal pain over the past month, primarily affecting the hips, shoulders, and arms, with heaviness, dull pain, and significant fatigue. Differential diagnosis includes PMR, polymyositis, and other inflammatory conditions. No jaw pain or rash. Symptoms significantly impact daily activities and quality of life.   - Order lab tests to check inflammatory markers and kidney function.  - Consider imaging studies of shoulders and hips if lab results are inconclusive.  - Advise sparing use of ibuprofen for symptomatic relief until lab results are available.  - Discuss potential use of steroids for treatment if PMR is confirmed, with consideration of blood sugar monitoring due to diabetes.    Type 2 diabetes mellitus without complications  Blood sugar levels are well-managed with recent readings between 95 and 170 mg/dL. No hyperglycemia over 200 mg/dL. Weight gain of approximately 5 pounds, possibly due to decreased activity levels secondary to musculoskeletal pain. No significant ankle swelling, but some swelling in hands.  - Continue current diabetes management regimen.  - Monitor blood sugar levels regularly.  - Evaluate kidney function as part of lab work to assess for potential fluid retention contributing to weight gain.    General Health Maintenance  Musculoskeletal pain and fatigue impact ability to engage in regular physical activity such as gardening and yoga. Weight gain possibly related to decreased activity.  - Encourage continuation of yoga as tolerated to maintain physical activity.  - Discuss potential lifestyle modifications to manage weight gain and improve energy levels.    Follow up as  chase Dean DO     This medical note was created with the assistance of artificial intelligence (AI) for documentation purposes. The content has been reviewed and confirmed by the healthcare provider for accuracy and completeness. Patient consented to the use of audio recording and use of AI during their visit.

## 2025-05-07 LAB
ALBUMIN SERPL-MCNC: 4 G/DL (ref 3.6–5.1)
ALBUMIN/CREAT UR: 37 MG/G CREAT
ALP SERPL-CCNC: 79 U/L (ref 37–153)
ALT SERPL-CCNC: 11 U/L (ref 6–29)
ANA SER QL IF: NEGATIVE
ANION GAP SERPL CALCULATED.4IONS-SCNC: 8 MMOL/L (CALC) (ref 7–17)
AST SERPL-CCNC: 10 U/L (ref 10–35)
BILIRUB SERPL-MCNC: 0.7 MG/DL (ref 0.2–1.2)
BUN SERPL-MCNC: 35 MG/DL (ref 7–25)
CALCIUM SERPL-MCNC: 8.4 MG/DL (ref 8.6–10.4)
CHLORIDE SERPL-SCNC: 105 MMOL/L (ref 98–110)
CK SERPL-CCNC: 44 U/L (ref 18–225)
CO2 SERPL-SCNC: 24 MMOL/L (ref 20–32)
CREAT SERPL-MCNC: 1.35 MG/DL (ref 0.6–1)
CREAT UR-MCNC: 73 MG/DL (ref 20–275)
CRP SERPL-MCNC: <3 MG/L
EGFRCR SERPLBLD CKD-EPI 2021: 41 ML/MIN/1.73M2
ERYTHROCYTE [SEDIMENTATION RATE] IN BLOOD BY WESTERGREN METHOD: 9 MM/H
GLUCOSE SERPL-MCNC: 226 MG/DL (ref 65–99)
MICROALBUMIN UR-MCNC: 2.7 MG/DL
POTASSIUM SERPL-SCNC: 5.1 MMOL/L (ref 3.5–5.3)
PROT SERPL-MCNC: 6.2 G/DL (ref 6.1–8.1)
SODIUM SERPL-SCNC: 137 MMOL/L (ref 135–146)

## 2025-05-12 ENCOUNTER — PATIENT MESSAGE (OUTPATIENT)
Dept: PRIMARY CARE | Facility: CLINIC | Age: 75
End: 2025-05-12
Payer: MEDICARE

## 2025-05-12 DIAGNOSIS — M25.551 BILATERAL HIP PAIN: Primary | ICD-10-CM

## 2025-05-12 DIAGNOSIS — M25.552 BILATERAL HIP PAIN: Primary | ICD-10-CM

## 2025-05-12 DIAGNOSIS — M25.512 CHRONIC PAIN OF BOTH SHOULDERS: ICD-10-CM

## 2025-05-12 DIAGNOSIS — G89.29 CHRONIC PAIN OF BOTH SHOULDERS: ICD-10-CM

## 2025-05-12 DIAGNOSIS — M25.511 CHRONIC PAIN OF BOTH SHOULDERS: ICD-10-CM

## 2025-05-12 DIAGNOSIS — M79.10 MUSCLE SORENESS: ICD-10-CM

## 2025-05-21 ENCOUNTER — HOSPITAL ENCOUNTER (OUTPATIENT)
Dept: RADIOLOGY | Facility: CLINIC | Age: 75
Discharge: HOME | End: 2025-05-21
Payer: MEDICARE

## 2025-05-21 DIAGNOSIS — M79.10 MUSCLE SORENESS: ICD-10-CM

## 2025-05-21 DIAGNOSIS — M25.512 CHRONIC PAIN OF BOTH SHOULDERS: ICD-10-CM

## 2025-05-21 DIAGNOSIS — M25.511 CHRONIC PAIN OF BOTH SHOULDERS: ICD-10-CM

## 2025-05-21 DIAGNOSIS — M25.552 BILATERAL HIP PAIN: ICD-10-CM

## 2025-05-21 DIAGNOSIS — M25.551 BILATERAL HIP PAIN: ICD-10-CM

## 2025-05-21 DIAGNOSIS — G89.29 CHRONIC PAIN OF BOTH SHOULDERS: ICD-10-CM

## 2025-05-21 PROCEDURE — 73521 X-RAY EXAM HIPS BI 2 VIEWS: CPT

## 2025-05-21 PROCEDURE — 73030 X-RAY EXAM OF SHOULDER: CPT | Mod: 50

## 2025-05-28 ENCOUNTER — APPOINTMENT (OUTPATIENT)
Dept: PRIMARY CARE | Facility: CLINIC | Age: 75
End: 2025-05-28
Payer: MEDICARE

## 2025-05-28 VITALS
SYSTOLIC BLOOD PRESSURE: 130 MMHG | OXYGEN SATURATION: 97 % | HEART RATE: 63 BPM | DIASTOLIC BLOOD PRESSURE: 60 MMHG | HEIGHT: 65 IN | WEIGHT: 230 LBS | BODY MASS INDEX: 38.32 KG/M2

## 2025-05-28 DIAGNOSIS — M79.10 MUSCLE SORENESS: ICD-10-CM

## 2025-05-28 DIAGNOSIS — R42 VERTIGO: ICD-10-CM

## 2025-05-28 DIAGNOSIS — C02.9 MALIGNANT NEOPLASM OF TONGUE (MULTI): ICD-10-CM

## 2025-05-28 DIAGNOSIS — G89.29 OTHER CHRONIC PAIN: ICD-10-CM

## 2025-05-28 DIAGNOSIS — M25.552 BILATERAL HIP PAIN: ICD-10-CM

## 2025-05-28 DIAGNOSIS — E78.2 MIXED HYPERLIPIDEMIA: ICD-10-CM

## 2025-05-28 DIAGNOSIS — I10 PRIMARY HYPERTENSION: Chronic | ICD-10-CM

## 2025-05-28 DIAGNOSIS — M25.512 PAIN OF BOTH SHOULDER JOINTS: ICD-10-CM

## 2025-05-28 DIAGNOSIS — E11.9 TYPE 2 DIABETES MELLITUS WITHOUT COMPLICATION, WITHOUT LONG-TERM CURRENT USE OF INSULIN: Chronic | ICD-10-CM

## 2025-05-28 DIAGNOSIS — M25.551 BILATERAL HIP PAIN: ICD-10-CM

## 2025-05-28 DIAGNOSIS — M25.511 PAIN OF BOTH SHOULDER JOINTS: ICD-10-CM

## 2025-05-28 DIAGNOSIS — E11.9 TYPE 2 DIABETES MELLITUS WITHOUT COMPLICATION, WITHOUT LONG-TERM CURRENT USE OF INSULIN: ICD-10-CM

## 2025-05-28 DIAGNOSIS — Z00.00 MEDICARE ANNUAL WELLNESS VISIT, SUBSEQUENT: Primary | ICD-10-CM

## 2025-05-28 LAB — HBA1C MFR BLD: 7.3 % (ref 4.2–6.5)

## 2025-05-28 PROCEDURE — 3075F SYST BP GE 130 - 139MM HG: CPT | Performed by: STUDENT IN AN ORGANIZED HEALTH CARE EDUCATION/TRAINING PROGRAM

## 2025-05-28 PROCEDURE — 1159F MED LIST DOCD IN RCRD: CPT | Performed by: STUDENT IN AN ORGANIZED HEALTH CARE EDUCATION/TRAINING PROGRAM

## 2025-05-28 PROCEDURE — 1124F ACP DISCUSS-NO DSCNMKR DOCD: CPT | Performed by: STUDENT IN AN ORGANIZED HEALTH CARE EDUCATION/TRAINING PROGRAM

## 2025-05-28 PROCEDURE — 1170F FXNL STATUS ASSESSED: CPT | Performed by: STUDENT IN AN ORGANIZED HEALTH CARE EDUCATION/TRAINING PROGRAM

## 2025-05-28 PROCEDURE — 3051F HG A1C>EQUAL 7.0%<8.0%: CPT | Performed by: STUDENT IN AN ORGANIZED HEALTH CARE EDUCATION/TRAINING PROGRAM

## 2025-05-28 PROCEDURE — 3078F DIAST BP <80 MM HG: CPT | Performed by: STUDENT IN AN ORGANIZED HEALTH CARE EDUCATION/TRAINING PROGRAM

## 2025-05-28 PROCEDURE — 1160F RVW MEDS BY RX/DR IN RCRD: CPT | Performed by: STUDENT IN AN ORGANIZED HEALTH CARE EDUCATION/TRAINING PROGRAM

## 2025-05-28 PROCEDURE — G0439 PPPS, SUBSEQ VISIT: HCPCS | Performed by: STUDENT IN AN ORGANIZED HEALTH CARE EDUCATION/TRAINING PROGRAM

## 2025-05-28 PROCEDURE — 99214 OFFICE O/P EST MOD 30 MIN: CPT | Performed by: STUDENT IN AN ORGANIZED HEALTH CARE EDUCATION/TRAINING PROGRAM

## 2025-05-28 PROCEDURE — 83036 HEMOGLOBIN GLYCOSYLATED A1C: CPT | Mod: CLIA WAIVED TEST | Performed by: STUDENT IN AN ORGANIZED HEALTH CARE EDUCATION/TRAINING PROGRAM

## 2025-05-28 PROCEDURE — 3008F BODY MASS INDEX DOCD: CPT | Performed by: STUDENT IN AN ORGANIZED HEALTH CARE EDUCATION/TRAINING PROGRAM

## 2025-05-28 PROCEDURE — 4010F ACE/ARB THERAPY RXD/TAKEN: CPT | Performed by: STUDENT IN AN ORGANIZED HEALTH CARE EDUCATION/TRAINING PROGRAM

## 2025-05-28 RX ORDER — ATORVASTATIN CALCIUM 20 MG/1
20 TABLET, FILM COATED ORAL DAILY
Qty: 90 TABLET | Refills: 3 | Status: SHIPPED | OUTPATIENT
Start: 2025-05-28

## 2025-05-28 RX ORDER — IRBESARTAN 75 MG/1
75 TABLET ORAL DAILY
Qty: 90 TABLET | Refills: 3 | Status: SHIPPED | OUTPATIENT
Start: 2025-05-28

## 2025-05-28 RX ORDER — GLIPIZIDE 10 MG/1
10 TABLET, FILM COATED, EXTENDED RELEASE ORAL
Qty: 90 TABLET | Refills: 3 | Status: SHIPPED | OUTPATIENT
Start: 2025-05-28

## 2025-05-28 RX ORDER — METFORMIN HYDROCHLORIDE 1000 MG/1
1000 TABLET ORAL DAILY
Qty: 90 TABLET | Refills: 3 | Status: SHIPPED | OUTPATIENT
Start: 2025-05-28

## 2025-05-28 RX ORDER — AMLODIPINE BESYLATE 10 MG/1
10 TABLET ORAL DAILY
Qty: 90 TABLET | Refills: 3 | Status: SHIPPED | OUTPATIENT
Start: 2025-05-28

## 2025-05-28 RX ORDER — SCOPOLAMINE 1 MG/3D
1 PATCH, EXTENDED RELEASE TRANSDERMAL
Qty: 10 PATCH | Refills: 0 | Status: SHIPPED | OUTPATIENT
Start: 2025-05-28

## 2025-05-28 ASSESSMENT — ACTIVITIES OF DAILY LIVING (ADL)
DRESSING: INDEPENDENT
TAKING_MEDICATION: INDEPENDENT
DOING_HOUSEWORK: INDEPENDENT
GROCERY_SHOPPING: INDEPENDENT
BATHING: INDEPENDENT
MANAGING_FINANCES: INDEPENDENT

## 2025-05-28 NOTE — PROGRESS NOTES
Subjective   Patient ID: Tess Nava is a 75 y.o. female who presents for Medicare Annual Wellness Visit Subsequent (Medicare wellness visit /Following up on body pains - go over bloodwork. She is still in a lot of pain- taking aleve about every 3 days and  will take tylenol inbetween /Going on a cruise in September- would like patches for behind her ear for nausea/ vertigo).  History of Present Illness  The patient is a 75-year-old female who presents for her annual wellness visit and to review her lab results.    She reports no significant changes in her condition since the last visit. She experiences pain, which she manages with Aleve, but notes that the pain recurs after the medication wears off. She does not experience pain while seated but had difficulty sleeping due to pain last night, necessitating frequent position changes. This was an unusual occurrence for her. Despite the pain, she engaged in gardening activities yesterday. She has been taking Aleve every 3 to 4 days, which provides some relief, and supplements with Tylenol as needed. She acknowledges the potential risks of frequent Aleve use. Has known stage 3 chronic kidney disease.     The combination of Aleve and Tylenol alleviates her stiffness and deep pain, allowing her to perform daily activities. She received a shoulder injection approximately 30 years ago, which did not provide significant relief. She is considering whether to continue her current regimen or explore other options. She describes her pain as muscular rather than joint-related and wonders if she could have polymyalgia rheumatica (PMR). She has not taken Cymbalta.    She has not monitored her blood sugar levels in the past week but reports that they were generally well-controlled previously, with occasional spikes. Her highest recent readings were between 170 and 190. She recalls a reading of over 200 during a blood donation visit, but she had eaten less than an hour prior. She  "has not tried Ozempic or Mounjaro due to cost concerns and fear of side effects. She reports no changes in urinary habits or vision. An ophthalmologist confirmed in 04/2025 that her diabetes and eyes were in good condition.    She has gained another pound and is making efforts to monitor her diet and increase her physical activity, but she continues to gain weight slowly. She expresses concern about her overall health and feels unsure about how to proceed.    Review of Systems  ROS otherwise negative aside from what was mentioned above in HPI.    Objective     /60 (BP Location: Right arm, Patient Position: Sitting, BP Cuff Size: Large adult)   Pulse 63   Ht 1.651 m (5' 5\")   Wt 104 kg (230 lb)   SpO2 97%   BMI 38.27 kg/m²      Current Outpatient Medications   Medication Instructions    albuterol 90 mcg/actuation inhaler 2 puffs, inhalation, Every 6 hours PRN    amLODIPine (NORVASC) 10 mg, oral, Daily    aspirin-calcium carbonate 81 mg-300 mg calcium(777 mg) tablet 1 tablet, Daily    atorvastatin (LIPITOR) 20 mg, oral, Daily    Blood glucose monitoring meter kit (Blood Glucose Monitoring) kit Use to test blood sugar *Non insulin dependent*    blood sugar diagnostic (Blood Glucose Test) strip Use to test blood sugar once daily NON INSULIN DEPENDANT    cholecalciferol (Vitamin D-3) 50 mcg (2,000 unit) capsule Weekly    CHROMIUM ORAL 800 mg, Daily    fish oil concentrate (Omega-3) 120-180 mg capsule Take by mouth.    glipiZIDE XL (GLUCOTROL XL) 10 mg, oral, Daily with breakfast    irbesartan (AVAPRO) 75 mg, oral, Daily    lancets misc Use to test blood sugar once daily NON INSULIN DEPENDENT    metFORMIN (GLUCOPHAGE) 1,000 mg, oral, Daily    PaxiL 40 mg tablet 1 tablet, Daily    pioglitazone (ACTOS) 15 mg, oral, Daily    scopolamine (Transderm-Scop) 1 mg over 3 days patch 3 day 1 patch, transdermal, Every 72 hours       Physical Exam  Neck: Supple, no abnormalities  Respiratory: Clear to auscultation, no " wheezing, rales or rhonchi  Physical Exam  Constitutional:       General: She is not in acute distress.  HENT:      Head: Normocephalic.      Right Ear: Tympanic membrane, ear canal and external ear normal. There is no impacted cerumen.      Left Ear: Tympanic membrane, ear canal and external ear normal. There is no impacted cerumen.      Nose: Nose normal.      Mouth/Throat:      Mouth: Mucous membranes are moist.   Eyes:      Pupils: Pupils are equal, round, and reactive to light.   Cardiovascular:      Rate and Rhythm: Normal rate and regular rhythm.   Pulmonary:      Effort: Pulmonary effort is normal. No respiratory distress.   Abdominal:      Palpations: Abdomen is soft.   Musculoskeletal:         General: Tenderness present.   Skin:     General: Skin is warm and dry.   Neurological:      Mental Status: She is alert. Mental status is at baseline.   Psychiatric:         Mood and Affect: Mood normal.         Behavior: Behavior normal.           Results  Labs   - A1c: 7.3    Imaging   - X-ray of both shoulders and hips: Arthritis    Assessment & Plan  1. Arthritis.  - Symptoms are consistent with arthritis, as confirmed by x-ray results showing arthritis in both shoulders and hips.  - Autoimmune tests returned normal, ruling out more complex conditions. She reports significant pain during movement and occasional severe pain at night.  - A referral to rheumatology will be made for further evaluation and potential treatment with autoimmune medications such as prednisone or methotrexate.  - Advised to limit Aleve but could take every 2-3 days sparingly during her upcoming cruise trip to aid with mobility. Cymbalta was discussed as an option for chronic pain management, which would be safe for her kidneys and diabetes.    2. Diabetes Mellitus.  - A1c has decreased from 7.6 to 7.3, indicating improved blood sugar control.  -previously did not have noticeable benefit with A1c or weight changes on jardiance so this was  discontinued  - Has not taken blood sugar readings in the past week but reports occasional high readings.  - Advised to continue monitoring blood sugar levels, especially if starting any new medications that might affect them.  - Ophthalmologist visit in 04/2025 confirmed no diabetic changes in her eyes.  -would consider GLP-1 medication in future, hesitant to make multiple changes at once    3. Weight management.  - Has gained another pound and is making efforts to monitor diet and increase physical activity.  - Continues to gain weight slowly despite efforts.  - Discussed that improved mobility might help increase activity levels and aid in weight loss.    4, CKD 3  -Check CMP  -consider nephrology follow up  -limit NSAIDs and nephrotoxic meds    Follow-up  - Follow up in 2 months.  - Will discuss sooner depending on rheumatology input and her response to treatment.        Nazia Dean, DO     This medical note was created with the assistance of artificial intelligence (AI) for documentation purposes. The content has been reviewed and confirmed by the healthcare provider for accuracy and completeness. Patient consented to the use of audio recording and use of AI during their visit.

## 2025-07-01 DIAGNOSIS — E11.9 TYPE 2 DIABETES MELLITUS WITHOUT COMPLICATION, WITHOUT LONG-TERM CURRENT USE OF INSULIN: Chronic | ICD-10-CM

## 2025-07-01 RX ORDER — PIOGLITAZONE 15 MG/1
15 TABLET ORAL DAILY
Qty: 90 TABLET | Refills: 3 | Status: SHIPPED | OUTPATIENT
Start: 2025-07-01 | End: 2025-12-28

## 2025-07-24 ENCOUNTER — APPOINTMENT (OUTPATIENT)
Dept: LAB | Facility: HOSPITAL | Age: 75
End: 2025-07-24
Payer: MEDICARE

## 2025-07-24 ENCOUNTER — APPOINTMENT (OUTPATIENT)
Dept: RHEUMATOLOGY | Facility: CLINIC | Age: 75
End: 2025-07-24
Payer: MEDICARE

## 2025-07-24 VITALS — DIASTOLIC BLOOD PRESSURE: 64 MMHG | HEART RATE: 65 BPM | OXYGEN SATURATION: 95 % | SYSTOLIC BLOOD PRESSURE: 125 MMHG

## 2025-07-24 DIAGNOSIS — M79.10 MYALGIA: Primary | ICD-10-CM

## 2025-07-24 DIAGNOSIS — M19.049 HAND ARTHROPATHY: ICD-10-CM

## 2025-07-24 DIAGNOSIS — E55.9 VITAMIN D DEFICIENCY: ICD-10-CM

## 2025-07-24 DIAGNOSIS — R53.83 OTHER FATIGUE: ICD-10-CM

## 2025-07-24 LAB — PROT SERPL-MCNC: 6.6 G/DL (ref 6.4–8.2)

## 2025-07-24 PROCEDURE — 1159F MED LIST DOCD IN RCRD: CPT | Performed by: INTERNAL MEDICINE

## 2025-07-24 PROCEDURE — 4010F ACE/ARB THERAPY RXD/TAKEN: CPT | Performed by: INTERNAL MEDICINE

## 2025-07-24 PROCEDURE — 1036F TOBACCO NON-USER: CPT | Performed by: INTERNAL MEDICINE

## 2025-07-24 PROCEDURE — 84155 ASSAY OF PROTEIN SERUM: CPT

## 2025-07-24 PROCEDURE — 1160F RVW MEDS BY RX/DR IN RCRD: CPT | Performed by: INTERNAL MEDICINE

## 2025-07-24 PROCEDURE — 86334 IMMUNOFIX E-PHORESIS SERUM: CPT

## 2025-07-24 PROCEDURE — 99204 OFFICE O/P NEW MOD 45 MIN: CPT | Performed by: INTERNAL MEDICINE

## 2025-07-24 PROCEDURE — 3074F SYST BP LT 130 MM HG: CPT | Performed by: INTERNAL MEDICINE

## 2025-07-24 PROCEDURE — 3078F DIAST BP <80 MM HG: CPT | Performed by: INTERNAL MEDICINE

## 2025-07-24 NOTE — PROGRESS NOTES
Initial Rheumatology Patient Visit    Chief Complaint:  Tess Nava is a 75 y.o. female presenting today for New Patient Visit (npv).    History of Presenting Problem:   75 y.o. female with Hx of Type 2 Diabetes present with Myalgia complaint. She report pain in her upper arms/shoulder, Her thighs and across her butt area. She report symptoms have been going on for 1 year and has worsened in the last 2-3 months. She report she takes Aleve 1-2  as needed which seem to help 30%.   She report she feel her legs feel weak and heavy. She has a hard time standing and doing the dishes.  She report chronic fatigue as well    Problem List:   Patient Active Problem List   Diagnosis    Type 2 diabetes mellitus    Thickened endometrium    Shortness of breath on exertion    Overactive bladder    Hypercholesterolemia    Arthralgia    Hemorrhoid    Hammer toe    GERD (gastroesophageal reflux disease)    Fatigue    Essential hypertension    Depression    CAD (coronary artery disease)    Asthma    Abnormal mammogram of left breast    Urinary incontinence in female    Stage 3 chronic kidney disease, unspecified whether stage 3a or 3b CKD (Multi)    Obesity, morbid (Multi)    Malignant neoplasm of tongue (Multi)       Past Medical History:   Past Medical History:   Diagnosis Date    Encounter for other screening for malignant neoplasm of breast 02/10/2021    Screening for breast cancer    Encounter for screening mammogram for malignant neoplasm of breast 07/21/2016    Other screening mammogram    Essential (primary) hypertension 02/10/2021    Benign essential hypertension    Essential (primary) hypertension 02/23/2021    Benign essential hypertension    Essential (primary) hypertension 10/19/2020    HTN (hypertension), benign    Essential (primary) hypertension 01/17/2019    HTN (hypertension), benign    Essential (primary) hypertension 01/17/2019    HTN (hypertension), benign    Essential (primary) hypertension 08/21/2020    HTN  (hypertension), benign    Essential (primary) hypertension 09/12/2019    HTN (hypertension), benign    Fibrocystic breast     Other diseases of tongue 03/07/2019    Tongue lesion    Other specified symptoms and signs involving the circulatory and respiratory systems 02/11/2019    Globus sensation    Personal history of malignant neoplasm of tongue 03/07/2019    History of tongue cancer    Personal history of other diseases of the circulatory system 03/28/2018    History of hypertension    Personal history of other diseases of the circulatory system 03/28/2018    History of hypertension    Personal history of other diseases of the circulatory system     History of hypertension    Personal history of other diseases of the digestive system 01/17/2019    History of glossitis    Personal history of other diseases of the digestive system 03/05/2019    History of oral lesions    Personal history of other diseases of the musculoskeletal system and connective tissue     History of arthritis    Personal history of other diseases of the respiratory system     History of asthma    Personal history of other drug therapy 10/23/2019    History of influenza vaccination    Personal history of other endocrine, nutritional and metabolic disease 09/12/2019    History of diabetes mellitus    Personal history of other endocrine, nutritional and metabolic disease 09/12/2019    History of diabetes mellitus    Personal history of other endocrine, nutritional and metabolic disease 12/18/2019    History of type 2 diabetes mellitus    Personal history of other endocrine, nutritional and metabolic disease 02/23/2021    History of type 2 diabetes mellitus    Personal history of other endocrine, nutritional and metabolic disease 02/28/2018    History of diabetes mellitus    Personal history of other endocrine, nutritional and metabolic disease 09/01/2020    History of hyperkalemia    Personal history of other endocrine, nutritional and metabolic  disease 10/19/2020    History of type 2 diabetes mellitus    Personal history of other endocrine, nutritional and metabolic disease 2018    History of type 2 diabetes mellitus    Personal history of other endocrine, nutritional and metabolic disease 10/23/2019    History of type 2 diabetes mellitus    Personal history of other endocrine, nutritional and metabolic disease 2022    History of type 2 diabetes mellitus    Personal history of other endocrine, nutritional and metabolic disease 10/15/2020    History of type 2 diabetes mellitus    Personal history of other endocrine, nutritional and metabolic disease 2018    History of type 2 diabetes mellitus    Personal history of other endocrine, nutritional and metabolic disease 2018    History of type 2 diabetes mellitus    Personal history of other endocrine, nutritional and metabolic disease 2021    History of type 2 diabetes mellitus    Personal history of other infectious and parasitic diseases 10/19/2020    History of Helicobacter pylori infection    Personal history of other infectious and parasitic diseases 10/15/2020    History of Helicobacter pylori infection    Personal history of other infectious and parasitic diseases     History of varicella    Personal history of other infectious and parasitic diseases     H/O scarlet fever    Personal history of other medical treatment 2018    History of screening mammography    Personal history of other specified conditions     History of vertigo    Tinea unguium 2015    Dermatophytosis, nail       Surgical History:   Past Surgical History:   Procedure Laterality Date    BREAST SURGERY Left     breast abscess removed     SECTION, CLASSIC  2021     Section    CHOLECYSTECTOMY  10/23/2017    Cholecystectomy Laparoscopic    DILATION AND CURETTAGE OF UTERUS  2018    Dilation And Curettage    OTHER SURGICAL HISTORY  2022    Hemiglossectomy    OTHER  SURGICAL HISTORY  02/07/2018    Hysteroscopy    OTHER SURGICAL HISTORY  06/16/2021    Cataract surgery    OTHER SURGICAL HISTORY  06/16/2021    Complete colonoscopy        Allergies:   Allergies   Allergen Reactions    Losartan Other    Hydrochlorothiazide Rash       Medications:   Current Outpatient Medications:     albuterol 90 mcg/actuation inhaler, Inhale 2 puffs every 6 hours if needed for wheezing., Disp: 6.7 g, Rfl: 0    amLODIPine (Norvasc) 10 mg tablet, Take 1 tablet (10 mg) by mouth once daily., Disp: 90 tablet, Rfl: 3    aspirin-calcium carbonate 81 mg-300 mg calcium(777 mg) tablet, Take 1 tablet by mouth once daily., Disp: , Rfl:     atorvastatin (Lipitor) 20 mg tablet, Take 1 tablet (20 mg) by mouth once daily., Disp: 90 tablet, Rfl: 3    Blood glucose monitoring meter kit (Blood Glucose Monitoring) kit, Use to test blood sugar *Non insulin dependent*, Disp: 1 each, Rfl: 0    blood sugar diagnostic (Blood Glucose Test) strip, Use to test blood sugar once daily NON INSULIN DEPENDANT, Disp: 100 strip, Rfl: 3    cholecalciferol (Vitamin D-3) 50 mcg (2,000 unit) capsule, Take by mouth per week., Disp: , Rfl:     CHROMIUM ORAL, Take 800 mg by mouth once daily., Disp: , Rfl:     fish oil concentrate (Omega-3) 120-180 mg capsule, Take by mouth., Disp: , Rfl:     glipiZIDE XL (Glucotrol XL) 10 mg 24 hr tablet, Take 1 tablet (10 mg) by mouth once daily with breakfast., Disp: 90 tablet, Rfl: 3    irbesartan (Avapro) 75 mg tablet, Take 1 tablet (75 mg) by mouth once daily., Disp: 90 tablet, Rfl: 3    lancets misc, Use to test blood sugar once daily NON INSULIN DEPENDENT, Disp: 100 each, Rfl: 3    metFORMIN (Glucophage) 1,000 mg tablet, Take 1 tablet (1,000 mg) by mouth once daily., Disp: 90 tablet, Rfl: 3    PaxiL 40 mg tablet, Take 1 tablet (40 mg) by mouth once daily., Disp: , Rfl:     pioglitazone (Actos) 15 mg tablet, Take 1 tablet (15 mg) by mouth once daily., Disp: 90 tablet, Rfl: 3    scopolamine  (Transderm-Scop) 1 mg over 3 days patch 3 day, Place 1 patch over 72 hours on the skin every 3rd day., Disp: 10 patch, Rfl: 0    Review of Systems:   ROS: She report Morning stiffness of 15 to 1hr in her legs and hands, Denies current  joint  swelling, She report dry mouth, Denies oral, nasal or genital ulcers, Denies sun sensitivity, Denies Raynaud's phenomenon. Denies Uveitis or recent vision changes. Denies new rashes or Denies Hx of Psoriasis, Denies alopecia,   Denies Chest pain/SOB, She report Hx of asthma, Denies Fever/chills/sweats, Denies Fatigue, Denies weight loss  Denies abdominal pain, Denies dysphasia, Denies nausea/vomiting/diarrhea, Denies dark/tarry stools, blood in stools or urine. Hx of Chronic back pain, with Hx of sciatica  Denies Hx of blood clot. Hx of easy bruising or bleeding. Denies Headaches, Denies numbness and tingling, weakness.  All other systems have been reviewed and are negative for complaint.     Objective   Physical Examination:    Visit Vitals  /64 (BP Location: Left arm, Patient Position: Sitting, BP Cuff Size: Adult)   Pulse 65   SpO2 95%   OB Status Postmenopausal   Smoking Status Never        Gen: NAD, A&O x 3  HEENT: clear sclera and conjunctiva,     Musculoskeletal:   Neck; WNL, full ROM  Shoulder: WNL, full ROM  Elbow:WNL, full ROM, no effusion noted  Wrist and fingers;no active synovitis noted, Full ROM in the Wrist , Good fist and   Knees:  No effusions or crepitation, full ROM.  Hips; WNL, full ROM, Negative Russell test  Ankle, Feet; WNL, full ROM    Skin: No rashes or lesions seen, no nail changes  Neuro: A&O x3, Normal Gait    Procedures :None    Orders:  Orders Placed This Encounter   Procedures    XR hand 3+ views bilateral    Vitamin D 25-Hydroxy,Total (for eval of Vitamin D levels)    Sedimentation Rate    C-Reactive Protein    Uric Acid    Extended Myositis Panel    Myoglobin    Aldolase    Serum Protein Electrophoresis + Immunofixation    CBC and Auto  Differential    Vitamin B12    TSH with reflex to Free T4 if abnormal        Provider Impression:   Assessment/Plan   Encounter Diagnoses   Name Primary?    Myalgia Yes    Hand arthropathy     Vitamin D deficiency     Other fatigue          75 y.o. female with Hx of Type 2 Diabetes present with Myalgia complaint. She report pain in her upper arms/shoulder, Her thighs and across her butt area.  Review of blood work done in the past showed patient has normal sed rate and CRP negative ADRIANA.  Low suspicion for underlying inflammatory conditions like PMR.  -At this time recommend patient hold her cholesterol medicine for the next couple weeks and try taking coq.10 100 mg twice daily  -obtain additional serologies including myositis panel  -Continue with Tylenol/Aleve as needed twice a day  -Follow-up will be based on workup

## 2025-07-27 LAB
25(OH)D3+25(OH)D2 SERPL-MCNC: 33 NG/ML (ref 30–100)
ALDOLASE SERPL-CCNC: 3.1 U/L
BASOPHILS # BLD AUTO: 11 CELLS/UL (ref 0–200)
BASOPHILS NFR BLD AUTO: 0.2 %
CN-1A IGG SERPL QL IA: NORMAL
CRP SERPL-MCNC: <3 MG/L
EJ AB SER QL: NORMAL
ENA JO1 AB SER QL IB: NORMAL
EOSINOPHIL # BLD AUTO: 83 CELLS/UL (ref 15–500)
EOSINOPHIL NFR BLD AUTO: 1.5 %
ERYTHROCYTE [DISTWIDTH] IN BLOOD BY AUTOMATED COUNT: 13.4 % (ref 11–15)
ERYTHROCYTE [SEDIMENTATION RATE] IN BLOOD BY WESTERGREN METHOD: 11 MM/H
HCT VFR BLD AUTO: 39 % (ref 35–45)
HGB BLD-MCNC: 12.2 G/DL (ref 11.7–15.5)
HMGCR IGG SER IA-ACNC: NORMAL [IU]/ML
LYMPHOCYTES # BLD AUTO: 1359 CELLS/UL (ref 850–3900)
LYMPHOCYTES NFR BLD AUTO: 24.7 %
MCH RBC QN AUTO: 29.8 PG (ref 27–33)
MCHC RBC AUTO-ENTMCNC: 31.3 G/DL (ref 32–36)
MCV RBC AUTO: 95.1 FL (ref 80–100)
MDA5 AB SER LINE BLOT-ACNC: NORMAL
MI-2 ALPHA AB SER LINE BLOT-ACNC: NORMAL
MI-2 BETA AB SER LINE BLOT-ACNC: NORMAL
MJ AB SER LINE BLOT-ACNC: NORMAL
MONOCYTES # BLD AUTO: 402 CELLS/UL (ref 200–950)
MONOCYTES NFR BLD AUTO: 7.3 %
MYOGLOBIN SERPL-MCNC: 50 MCG/L
NEUTROPHILS # BLD AUTO: 3647 CELLS/UL (ref 1500–7800)
NEUTROPHILS NFR BLD AUTO: 66.3 %
OJ AB SER QL IB: NORMAL
PL12 AB SER QL IB: NORMAL
PL7 AB SER QL IB: NORMAL
PLATELET # BLD AUTO: 240 THOUSAND/UL (ref 140–400)
PMV BLD REES-ECKER: 8.9 FL (ref 7.5–12.5)
RBC # BLD AUTO: 4.1 MILLION/UL (ref 3.8–5.1)
SRP AB SERPL QL IB: NORMAL
TIF1-GAMMA AB SER LINE BLOT-ACNC: NORMAL
TSH SERPL-ACNC: 2.72 MIU/L (ref 0.4–4.5)
URATE SERPL-MCNC: 6.4 MG/DL (ref 2.5–7)
VIT B12 SERPL-MCNC: 177 PG/ML (ref 200–1100)
WBC # BLD AUTO: 5.5 THOUSAND/UL (ref 3.8–10.8)

## 2025-07-29 ENCOUNTER — APPOINTMENT (OUTPATIENT)
Dept: PRIMARY CARE | Facility: CLINIC | Age: 75
End: 2025-07-29
Payer: MEDICARE

## 2025-07-29 VITALS
DIASTOLIC BLOOD PRESSURE: 60 MMHG | OXYGEN SATURATION: 97 % | HEART RATE: 65 BPM | HEIGHT: 65 IN | WEIGHT: 224 LBS | BODY MASS INDEX: 37.32 KG/M2 | SYSTOLIC BLOOD PRESSURE: 144 MMHG

## 2025-07-29 DIAGNOSIS — E11.9 TYPE 2 DIABETES MELLITUS WITHOUT COMPLICATION, WITHOUT LONG-TERM CURRENT USE OF INSULIN: Primary | ICD-10-CM

## 2025-07-29 DIAGNOSIS — E78.2 MIXED HYPERLIPIDEMIA: ICD-10-CM

## 2025-07-29 DIAGNOSIS — M25.552 BILATERAL HIP PAIN: ICD-10-CM

## 2025-07-29 DIAGNOSIS — R53.83 OTHER FATIGUE: ICD-10-CM

## 2025-07-29 DIAGNOSIS — M25.512 CHRONIC PAIN OF BOTH SHOULDERS: ICD-10-CM

## 2025-07-29 DIAGNOSIS — M25.511 CHRONIC PAIN OF BOTH SHOULDERS: ICD-10-CM

## 2025-07-29 DIAGNOSIS — E53.8 VITAMIN B 12 DEFICIENCY: ICD-10-CM

## 2025-07-29 DIAGNOSIS — I25.10 CORONARY ARTERY DISEASE DUE TO LIPID RICH PLAQUE: ICD-10-CM

## 2025-07-29 DIAGNOSIS — I25.83 CORONARY ARTERY DISEASE DUE TO LIPID RICH PLAQUE: ICD-10-CM

## 2025-07-29 DIAGNOSIS — M25.551 BILATERAL HIP PAIN: ICD-10-CM

## 2025-07-29 DIAGNOSIS — G89.29 CHRONIC PAIN OF BOTH SHOULDERS: ICD-10-CM

## 2025-07-29 DIAGNOSIS — I10 PRIMARY HYPERTENSION: ICD-10-CM

## 2025-07-29 DIAGNOSIS — M79.10 MUSCLE SORENESS: ICD-10-CM

## 2025-07-29 PROCEDURE — 3077F SYST BP >= 140 MM HG: CPT | Performed by: STUDENT IN AN ORGANIZED HEALTH CARE EDUCATION/TRAINING PROGRAM

## 2025-07-29 PROCEDURE — 1159F MED LIST DOCD IN RCRD: CPT | Performed by: STUDENT IN AN ORGANIZED HEALTH CARE EDUCATION/TRAINING PROGRAM

## 2025-07-29 PROCEDURE — 1160F RVW MEDS BY RX/DR IN RCRD: CPT | Performed by: STUDENT IN AN ORGANIZED HEALTH CARE EDUCATION/TRAINING PROGRAM

## 2025-07-29 PROCEDURE — 99214 OFFICE O/P EST MOD 30 MIN: CPT | Performed by: STUDENT IN AN ORGANIZED HEALTH CARE EDUCATION/TRAINING PROGRAM

## 2025-07-29 PROCEDURE — 1036F TOBACCO NON-USER: CPT | Performed by: STUDENT IN AN ORGANIZED HEALTH CARE EDUCATION/TRAINING PROGRAM

## 2025-07-29 PROCEDURE — 4010F ACE/ARB THERAPY RXD/TAKEN: CPT | Performed by: STUDENT IN AN ORGANIZED HEALTH CARE EDUCATION/TRAINING PROGRAM

## 2025-07-29 PROCEDURE — G2211 COMPLEX E/M VISIT ADD ON: HCPCS | Performed by: STUDENT IN AN ORGANIZED HEALTH CARE EDUCATION/TRAINING PROGRAM

## 2025-07-29 PROCEDURE — 3008F BODY MASS INDEX DOCD: CPT | Performed by: STUDENT IN AN ORGANIZED HEALTH CARE EDUCATION/TRAINING PROGRAM

## 2025-07-29 PROCEDURE — 3078F DIAST BP <80 MM HG: CPT | Performed by: STUDENT IN AN ORGANIZED HEALTH CARE EDUCATION/TRAINING PROGRAM

## 2025-07-29 RX ORDER — IBUPROFEN 200 MG
CAPSULE ORAL
Qty: 100 STRIP | Refills: 3 | Status: SHIPPED | OUTPATIENT
Start: 2025-07-29

## 2025-07-29 RX ORDER — LANCETS
EACH MISCELLANEOUS
Qty: 100 EACH | Refills: 3 | Status: SHIPPED | OUTPATIENT
Start: 2025-07-29

## 2025-07-29 NOTE — PROGRESS NOTES
Subjective   Patient ID: Tess Nava is a 75 y.o. female who presents for Follow-up (Follow up visit - saw rheumatology and there was no outcome with that visit. She states that the  Told her she did not fit in to any boxes for auto immune disease - did some more blood work and she was lower end  on vit D and B 12. Was told to start CO-Q10 but she did want to wait to talk today about this /Overall still experiencing the same pains- some days are better than others ).  History of Present Illness  Tess Nava is a 75 year old female who presents with persistent musculoskeletal pain despite normal blood work.    She experiences ongoing pain that varies in location, primarily affecting her legs and occasionally her shoulders. The pain is intermittent and varies in intensity, sometimes taking an hour to get moving in the morning, while other days she feels better within ten minutes. Sitting provides some relief, but lying down at night causes discomfort. She has tried topical treatments like Voltaren cream and oral medications like Advil or Aleve, which provide short-term relief but do not eliminate the pain.    She has a history of sciatica from the previous year, which resolved with exercises. She denies chronic back pain currently. She experiences heaviness in her legs, especially when standing for prolonged periods, and sometimes feels the need to hold herself steady. No night sweats, weight loss, or chronic back pain. Occasionally, she experiences dry eyes. Her energy levels are low, and she only performs necessary activities, often forcing herself to exercise. She has not been able to attend exercise classes recently due to pain and late waking. She finds the heat and humidity exacerbate her symptoms, limiting her outdoor activities.    She saw rheumatology but was told she didn't fit into any specific pattern of autoimmune disease or myopathy. Was recommended to stop her statin and could try coq10. Has  "not tried yet. Some additional labs are pending.     She has been using her daughter's pool for exercise, which helps her legs feel better. She has not checked her blood sugars recently and requires refills for her lancets and strips. She reports increased coughing when outside for extended periods, possibly due to allergies. She denies waking up with breathing difficulties but does get up multiple times at night due to discomfort and to use the bathroom.    She was informed of low B12 levels and advised to take a multivitamin. She does not currently take a multivitamin but continues with vitamin D supplementation.    Review of Systems  ROS otherwise negative aside from what was mentioned above in HPI.    Objective     /60 (BP Location: Left arm, Patient Position: Sitting, BP Cuff Size: Large adult)   Pulse 65   Ht 1.651 m (5' 5\")   Wt 102 kg (224 lb)   SpO2 97%   BMI 37.28 kg/m²      Current Outpatient Medications   Medication Instructions    albuterol 90 mcg/actuation inhaler 2 puffs, inhalation, Every 6 hours PRN    amLODIPine (NORVASC) 10 mg, oral, Daily    aspirin-calcium carbonate 81 mg-300 mg calcium(777 mg) tablet 1 tablet, Daily    atorvastatin (LIPITOR) 20 mg, oral, Daily    Blood glucose monitoring meter kit (Blood Glucose Monitoring) kit Use to test blood sugar *Non insulin dependent*    blood sugar diagnostic (Blood Glucose Test) Use to test blood sugar once daily NON INSULIN DEPENDANT    cholecalciferol (Vitamin D-3) 50 mcg (2,000 unit) capsule Weekly    CHROMIUM ORAL 800 mg, Daily    fish oil concentrate (Omega-3) 120-180 mg capsule Take by mouth.    glipiZIDE XL (GLUCOTROL XL) 10 mg, oral, Daily with breakfast    irbesartan (AVAPRO) 75 mg, oral, Daily    lancets misc Use to test blood sugar once daily NON INSULIN DEPENDENT    metFORMIN (GLUCOPHAGE) 1,000 mg, oral, Daily    PaxiL 40 mg tablet 1 tablet, Daily    pioglitazone (ACTOS) 15 mg, oral, Daily    scopolamine (Transderm-Scop) 1 mg " over 3 days patch 3 day 1 patch, transdermal, Every 72 hours       Physical Exam  GENERAL: Alert, cooperative, well developed, no acute distress.  CHEST: Clear to auscultation bilaterally, no wheezes, rhonchi, or crackles.  CV: Normal heart rate and rhythm, S1 and S2 normal without murmurs.  ABDOMEN: Soft, non-tender, non-distended.  NEURO: Cranial nerves grossly intact, moves all extremities without gross motor impairment, normal ROM.    Results  LABS  Vitamin B12: low    Assessment & Plan  Arthralgia and myalgia  Chronic pain with intermittent exacerbations, primarily affecting the legs and shoulders. Pain is variable, with some days being more manageable than others. Pain is not associated with any new numbness, tingling, or weakness. Heavy legs occur when standing for prolonged periods. Pain is partially relieved by topical creams and NSAIDs. Statin-induced myopathy is considered as a potential cause of symptoms.  - Discontinue atorvastatin for one month to assess for improvement in symptoms  - Encourage continuation of water aerobics for low-impact exercise  -await remaining lab results    Type 2 diabetes mellitus  Blood glucose monitoring has not been consistent. No recent blood sugar readings available.  - Refill lancets and blood glucose test strips  - Encourage resumption of regular blood glucose monitoring  - Plan to recheck A1c in one to two months    Vitamin B12 deficiency  Low B12 levels noted. No current multivitamin use. B12 deficiency may contribute to fatigue and other symptoms.  - Start a general multivitamin  - Recheck B12 levels in one to two months    Hyperlipidemia  Currently managed with atorvastatin, which is being discontinued to assess for potential myopathy.  - Discontinue atorvastatin for one month    Follow up in 1-2 months    Nazia Dean, DO     This medical note was created with the assistance of artificial intelligence (AI) for documentation purposes. The content has been reviewed  and confirmed by the healthcare provider for accuracy and completeness. Patient consented to the use of audio recording and use of AI during their visit.

## 2025-07-31 DIAGNOSIS — G72.9 MYOPATHY: Primary | ICD-10-CM

## 2025-07-31 LAB
ALBUMIN: 3.9 G/DL (ref 3.4–5)
ALPHA 1 GLOBULIN: 0.3 G/DL (ref 0.2–0.6)
ALPHA 2 GLOBULIN: 0.7 G/DL (ref 0.4–1.1)
BETA GLOBULIN: 0.9 G/DL (ref 0.5–1.2)
GAMMA GLOBULIN: 0.8 G/DL (ref 0.5–1.4)
IMMUNOFIXATION COMMENT: NORMAL
PATH REVIEW - SERUM IMMUNOFIXATION: NORMAL
PATH REVIEW-SERUM PROTEIN ELECTROPHORESIS: NORMAL
PROTEIN ELECTROPHORESIS COMMENT: NORMAL

## 2025-08-03 LAB
25(OH)D3+25(OH)D2 SERPL-MCNC: 33 NG/ML (ref 30–100)
ALDOLASE SERPL-CCNC: 3.1 U/L
BASOPHILS # BLD AUTO: 11 CELLS/UL (ref 0–200)
BASOPHILS NFR BLD AUTO: 0.2 %
CN-1A IGG SERPL QL IA: 63 UNITS
CRP SERPL-MCNC: <3 MG/L
EJ AB SER QL: <11 SI
ENA JO1 AB SER QL IB: <11 SI
EOSINOPHIL # BLD AUTO: 83 CELLS/UL (ref 15–500)
EOSINOPHIL NFR BLD AUTO: 1.5 %
ERYTHROCYTE [DISTWIDTH] IN BLOOD BY AUTOMATED COUNT: 13.4 % (ref 11–15)
ERYTHROCYTE [SEDIMENTATION RATE] IN BLOOD BY WESTERGREN METHOD: 11 MM/H
HCT VFR BLD AUTO: 39 % (ref 35–45)
HGB BLD-MCNC: 12.2 G/DL (ref 11.7–15.5)
HMGCR IGG SER IA-ACNC: <2 CU
LYMPHOCYTES # BLD AUTO: 1359 CELLS/UL (ref 850–3900)
LYMPHOCYTES NFR BLD AUTO: 24.7 %
MCH RBC QN AUTO: 29.8 PG (ref 27–33)
MCHC RBC AUTO-ENTMCNC: 31.3 G/DL (ref 32–36)
MCV RBC AUTO: 95.1 FL (ref 80–100)
MDA5 AB SER LINE BLOT-ACNC: <11 SI
MI-2 ALPHA AB SER LINE BLOT-ACNC: <11 SI
MI-2 BETA AB SER LINE BLOT-ACNC: 11 SI
MJ AB SER LINE BLOT-ACNC: <11 SI
MONOCYTES # BLD AUTO: 402 CELLS/UL (ref 200–950)
MONOCYTES NFR BLD AUTO: 7.3 %
MYOGLOBIN SERPL-MCNC: 50 MCG/L
NEUTROPHILS # BLD AUTO: 3647 CELLS/UL (ref 1500–7800)
NEUTROPHILS NFR BLD AUTO: 66.3 %
OJ AB SER QL IB: <11 SI
PL12 AB SER QL IB: <11 SI
PL7 AB SER QL IB: <11 SI
PLATELET # BLD AUTO: 240 THOUSAND/UL (ref 140–400)
PMV BLD REES-ECKER: 8.9 FL (ref 7.5–12.5)
RBC # BLD AUTO: 4.1 MILLION/UL (ref 3.8–5.1)
SRP AB SERPL QL IB: <11 SI
TIF1-GAMMA AB SER LINE BLOT-ACNC: <11 SI
TSH SERPL-ACNC: 2.72 MIU/L (ref 0.4–4.5)
URATE SERPL-MCNC: 6.4 MG/DL (ref 2.5–7)
VIT B12 SERPL-MCNC: 177 PG/ML (ref 200–1100)
WBC # BLD AUTO: 5.5 THOUSAND/UL (ref 3.8–10.8)

## 2025-08-07 ENCOUNTER — HOSPITAL ENCOUNTER (OUTPATIENT)
Dept: NEUROLOGY | Facility: HOSPITAL | Age: 75
Discharge: HOME | End: 2025-08-07
Payer: MEDICARE

## 2025-08-07 DIAGNOSIS — G72.9 MYOPATHY: ICD-10-CM

## 2025-08-07 PROCEDURE — 95908 NRV CNDJ TST 3-4 STUDIES: CPT | Performed by: PSYCHIATRY & NEUROLOGY

## 2025-08-07 PROCEDURE — 95886 MUSC TEST DONE W/N TEST COMP: CPT | Performed by: PSYCHIATRY & NEUROLOGY

## 2025-09-03 ENCOUNTER — APPOINTMENT (OUTPATIENT)
Dept: PRIMARY CARE | Facility: CLINIC | Age: 75
End: 2025-09-03
Payer: MEDICARE

## 2025-09-09 ENCOUNTER — APPOINTMENT (OUTPATIENT)
Dept: RHEUMATOLOGY | Facility: CLINIC | Age: 75
End: 2025-09-09
Payer: MEDICARE

## 2025-12-10 ENCOUNTER — APPOINTMENT (OUTPATIENT)
Dept: PRIMARY CARE | Facility: CLINIC | Age: 75
End: 2025-12-10
Payer: MEDICARE